# Patient Record
Sex: FEMALE | Race: WHITE | Employment: OTHER | URBAN - METROPOLITAN AREA
[De-identification: names, ages, dates, MRNs, and addresses within clinical notes are randomized per-mention and may not be internally consistent; named-entity substitution may affect disease eponyms.]

---

## 2017-06-13 ENCOUNTER — HOSPITAL ENCOUNTER (OUTPATIENT)
Dept: WOMENS IMAGING | Age: 77
Discharge: HOME | End: 2017-06-13
Attending: INTERNAL MEDICINE | Admitting: INTERNAL MEDICINE

## 2017-06-13 DIAGNOSIS — R92.8 ABNORMAL MAMMOGRAM: ICD-10-CM

## 2017-06-13 DIAGNOSIS — R92.8 OTHER ABNORMAL AND INCONCLUSIVE FINDINGS ON DIAGNOSTIC IMAGING OF BREAST: ICD-10-CM

## 2017-11-14 ENCOUNTER — HOSPITAL ENCOUNTER (OUTPATIENT)
Dept: OTHER | Age: 77
Discharge: HOME | End: 2017-11-14
Attending: INTERNAL MEDICINE | Admitting: INTERNAL MEDICINE

## 2017-11-14 DIAGNOSIS — J18.9 UNRESOLVED PNEUMONIA: ICD-10-CM

## 2017-12-04 ENCOUNTER — TELEPHONE (OUTPATIENT)
Dept: CARDIOLOGY CLINIC | Age: 77
End: 2017-12-04

## 2017-12-13 ENCOUNTER — HOSPITAL ENCOUNTER (OUTPATIENT)
Dept: WOMENS IMAGING | Age: 77
Discharge: HOME | End: 2017-12-13
Attending: INTERNAL MEDICINE | Admitting: INTERNAL MEDICINE

## 2017-12-13 DIAGNOSIS — Z12.31 VISIT FOR SCREENING MAMMOGRAM: ICD-10-CM

## 2017-12-13 DIAGNOSIS — R92.2 INCONCLUSIVE MAMMOGRAM: ICD-10-CM

## 2019-01-03 ENCOUNTER — HOSPITAL ENCOUNTER (OUTPATIENT)
Dept: WOMENS IMAGING | Age: 79
Discharge: HOME | End: 2019-01-03
Attending: INTERNAL MEDICINE | Admitting: INTERNAL MEDICINE
Payer: MEDICARE

## 2019-01-03 DIAGNOSIS — Z12.31 VISIT FOR SCREENING MAMMOGRAM: ICD-10-CM

## 2019-01-03 PROCEDURE — 77063 BREAST TOMOSYNTHESIS BI: CPT

## 2019-01-25 ENCOUNTER — APPOINTMENT (OUTPATIENT)
Dept: ULTRASOUND IMAGING | Age: 79
End: 2019-01-25
Payer: MEDICARE

## 2019-01-25 ENCOUNTER — HOSPITAL ENCOUNTER (OUTPATIENT)
Dept: WOMENS IMAGING | Age: 79
Discharge: HOME | End: 2019-01-25
Payer: MEDICARE

## 2019-01-25 DIAGNOSIS — R92.8 ABNORMAL MAMMOGRAM: ICD-10-CM

## 2019-01-25 PROCEDURE — G0279 TOMOSYNTHESIS, MAMMO: HCPCS

## 2019-07-31 ENCOUNTER — HOSPITAL ENCOUNTER (OUTPATIENT)
Dept: WOMENS IMAGING | Age: 79
Discharge: HOME | End: 2019-07-31
Payer: MEDICARE

## 2019-07-31 DIAGNOSIS — R92.8 ABNORMAL MAMMOGRAM: ICD-10-CM

## 2019-07-31 PROCEDURE — G0279 TOMOSYNTHESIS, MAMMO: HCPCS

## 2019-12-14 ENCOUNTER — APPOINTMENT (OUTPATIENT)
Dept: GENERAL RADIOLOGY | Age: 79
End: 2019-12-14
Payer: MEDICARE

## 2019-12-14 ENCOUNTER — HOSPITAL ENCOUNTER (EMERGENCY)
Age: 79
Discharge: LEFT BEFORE DISCHARGE | End: 2019-12-14
Attending: EMERGENCY MEDICINE
Payer: MEDICARE

## 2019-12-14 VITALS
RESPIRATION RATE: 22 BRPM | DIASTOLIC BLOOD PRESSURE: 60 MMHG | BODY MASS INDEX: 23.47 KG/M2 | OXYGEN SATURATION: 98 % | WEIGHT: 132.5 LBS | HEART RATE: 62 BPM | TEMPERATURE: 97.4 F | SYSTOLIC BLOOD PRESSURE: 148 MMHG

## 2019-12-14 DIAGNOSIS — J18.9 PNEUMONIA DUE TO ORGANISM: Primary | ICD-10-CM

## 2019-12-14 LAB
A/G RATIO: 1.3 (ref 1.1–2.2)
ALBUMIN SERPL-MCNC: 3.9 G/DL (ref 3.4–5)
ALP BLD-CCNC: 63 U/L (ref 40–129)
ALT SERPL-CCNC: 22 U/L (ref 10–40)
ANION GAP SERPL CALCULATED.3IONS-SCNC: 15 MMOL/L (ref 3–16)
APTT: 31.4 SEC (ref 24.2–36.2)
AST SERPL-CCNC: 38 U/L (ref 15–37)
BASOPHILS ABSOLUTE: 0 K/UL (ref 0–0.2)
BASOPHILS RELATIVE PERCENT: 1 %
BILIRUB SERPL-MCNC: 0.4 MG/DL (ref 0–1)
BUN BLDV-MCNC: 17 MG/DL (ref 7–20)
CALCIUM SERPL-MCNC: 9.5 MG/DL (ref 8.3–10.6)
CHLORIDE BLD-SCNC: 103 MMOL/L (ref 99–110)
CO2: 22 MMOL/L (ref 21–32)
CREAT SERPL-MCNC: 0.8 MG/DL (ref 0.6–1.2)
EOSINOPHILS ABSOLUTE: 0.3 K/UL (ref 0–0.6)
EOSINOPHILS RELATIVE PERCENT: 5.5 %
GFR AFRICAN AMERICAN: >60
GFR NON-AFRICAN AMERICAN: >60
GLOBULIN: 2.9 G/DL
GLUCOSE BLD-MCNC: 89 MG/DL (ref 70–99)
HCT VFR BLD CALC: 42 % (ref 36–48)
HEMOGLOBIN: 14 G/DL (ref 12–16)
INR BLD: 1.01 (ref 0.86–1.14)
LYMPHOCYTES ABSOLUTE: 1 K/UL (ref 1–5.1)
LYMPHOCYTES RELATIVE PERCENT: 21.1 %
MCH RBC QN AUTO: 32.3 PG (ref 26–34)
MCHC RBC AUTO-ENTMCNC: 33.4 G/DL (ref 31–36)
MCV RBC AUTO: 96.5 FL (ref 80–100)
MONOCYTES ABSOLUTE: 0.5 K/UL (ref 0–1.3)
MONOCYTES RELATIVE PERCENT: 11.4 %
NEUTROPHILS ABSOLUTE: 2.8 K/UL (ref 1.7–7.7)
NEUTROPHILS RELATIVE PERCENT: 61 %
PDW BLD-RTO: 14.3 % (ref 12.4–15.4)
PLATELET # BLD: 203 K/UL (ref 135–450)
PMV BLD AUTO: 9.6 FL (ref 5–10.5)
POTASSIUM SERPL-SCNC: 5.3 MMOL/L (ref 3.5–5.1)
PRO-BNP: 998 PG/ML (ref 0–449)
PROTHROMBIN TIME: 11.7 SEC (ref 10–13.2)
RBC # BLD: 4.35 M/UL (ref 4–5.2)
SODIUM BLD-SCNC: 140 MMOL/L (ref 136–145)
TOTAL PROTEIN: 6.8 G/DL (ref 6.4–8.2)
TROPONIN: <0.01 NG/ML
WBC # BLD: 4.6 K/UL (ref 4–11)

## 2019-12-14 PROCEDURE — 87040 BLOOD CULTURE FOR BACTERIA: CPT

## 2019-12-14 PROCEDURE — 83880 ASSAY OF NATRIURETIC PEPTIDE: CPT

## 2019-12-14 PROCEDURE — 84484 ASSAY OF TROPONIN QUANT: CPT

## 2019-12-14 PROCEDURE — 71046 X-RAY EXAM CHEST 2 VIEWS: CPT

## 2019-12-14 PROCEDURE — 85730 THROMBOPLASTIN TIME PARTIAL: CPT

## 2019-12-14 PROCEDURE — 85025 COMPLETE CBC W/AUTO DIFF WBC: CPT

## 2019-12-14 PROCEDURE — 36415 COLL VENOUS BLD VENIPUNCTURE: CPT

## 2019-12-14 PROCEDURE — 80053 COMPREHEN METABOLIC PANEL: CPT

## 2019-12-14 PROCEDURE — 85610 PROTHROMBIN TIME: CPT

## 2019-12-14 PROCEDURE — 99284 EMERGENCY DEPT VISIT MOD MDM: CPT

## 2019-12-14 PROCEDURE — 93005 ELECTROCARDIOGRAM TRACING: CPT | Performed by: EMERGENCY MEDICINE

## 2019-12-14 RX ORDER — AZITHROMYCIN 250 MG/1
TABLET, FILM COATED ORAL
Qty: 1 PACKET | Refills: 0 | Status: SHIPPED | OUTPATIENT
Start: 2019-12-14 | End: 2019-12-24

## 2019-12-14 ASSESSMENT — PAIN SCALES - GENERAL
PAINLEVEL_OUTOF10: 0
PAINLEVEL_OUTOF10: 0

## 2019-12-16 LAB
EKG ATRIAL RATE: 65 BPM
EKG DIAGNOSIS: NORMAL
EKG P AXIS: 85 DEGREES
EKG P-R INTERVAL: 148 MS
EKG Q-T INTERVAL: 458 MS
EKG QRS DURATION: 100 MS
EKG QTC CALCULATION (BAZETT): 476 MS
EKG R AXIS: 48 DEGREES
EKG T AXIS: 61 DEGREES
EKG VENTRICULAR RATE: 65 BPM

## 2019-12-16 PROCEDURE — 93010 ELECTROCARDIOGRAM REPORT: CPT | Performed by: INTERNAL MEDICINE

## 2019-12-18 LAB
BLOOD CULTURE, ROUTINE: NORMAL
CULTURE, BLOOD 2: NORMAL

## 2020-01-31 ENCOUNTER — HOSPITAL ENCOUNTER (OUTPATIENT)
Dept: WOMENS IMAGING | Age: 80
Discharge: HOME | End: 2020-01-31
Payer: MEDICARE

## 2020-01-31 PROCEDURE — G0279 TOMOSYNTHESIS, MAMMO: HCPCS

## 2020-06-10 ENCOUNTER — APPOINTMENT (OUTPATIENT)
Dept: CT IMAGING | Age: 80
End: 2020-06-10
Payer: MEDICARE

## 2020-06-10 ENCOUNTER — HOSPITAL ENCOUNTER (EMERGENCY)
Age: 80
Discharge: HOME | End: 2020-06-10
Attending: EMERGENCY MEDICINE
Payer: MEDICARE

## 2020-06-10 ENCOUNTER — APPOINTMENT (OUTPATIENT)
Dept: GENERAL RADIOLOGY | Age: 80
End: 2020-06-10
Payer: MEDICARE

## 2020-06-10 VITALS
HEART RATE: 62 BPM | TEMPERATURE: 100 F | SYSTOLIC BLOOD PRESSURE: 132 MMHG | OXYGEN SATURATION: 99 % | DIASTOLIC BLOOD PRESSURE: 90 MMHG | RESPIRATION RATE: 18 BRPM

## 2020-06-10 PROCEDURE — 99284 EMERGENCY DEPT VISIT MOD MDM: CPT

## 2020-06-10 PROCEDURE — 26770 TREAT FINGER DISLOCATION: CPT

## 2020-06-10 PROCEDURE — 90471 IMMUNIZATION ADMIN: CPT | Performed by: PHYSICIAN ASSISTANT

## 2020-06-10 PROCEDURE — 70450 CT HEAD/BRAIN W/O DYE: CPT

## 2020-06-10 PROCEDURE — 90715 TDAP VACCINE 7 YRS/> IM: CPT | Performed by: PHYSICIAN ASSISTANT

## 2020-06-10 PROCEDURE — U0003 INFECTIOUS AGENT DETECTION BY NUCLEIC ACID (DNA OR RNA); SEVERE ACUTE RESPIRATORY SYNDROME CORONAVIRUS 2 (SARS-COV-2) (CORONAVIRUS DISEASE [COVID-19]), AMPLIFIED PROBE TECHNIQUE, MAKING USE OF HIGH THROUGHPUT TECHNOLOGIES AS DESCRIBED BY CMS-2020-01-R: HCPCS

## 2020-06-10 PROCEDURE — 73120 X-RAY EXAM OF HAND: CPT

## 2020-06-10 PROCEDURE — 73130 X-RAY EXAM OF HAND: CPT

## 2020-06-10 PROCEDURE — 96372 THER/PROPH/DIAG INJ SC/IM: CPT

## 2020-06-10 PROCEDURE — 6360000002 HC RX W HCPCS: Performed by: PHYSICIAN ASSISTANT

## 2020-06-10 PROCEDURE — U0002 COVID-19 LAB TEST NON-CDC: HCPCS

## 2020-06-10 RX ORDER — BRIMONIDINE TARTRATE 2 MG/ML
1 SOLUTION/ DROPS OPHTHALMIC 2 TIMES DAILY
COMMUNITY
Start: 2019-05-20 | End: 2021-07-01

## 2020-06-10 RX ORDER — CEPHALEXIN 500 MG/1
500 CAPSULE ORAL 4 TIMES DAILY
Qty: 12 CAPSULE | Refills: 0 | Status: SHIPPED | OUTPATIENT
Start: 2020-06-10 | End: 2020-06-13

## 2020-06-10 RX ORDER — DONEPEZIL HYDROCHLORIDE 10 MG/1
10 TABLET, FILM COATED ORAL NIGHTLY
COMMUNITY
Start: 2018-10-24 | End: 2021-07-01

## 2020-06-10 RX ORDER — LATANOPROST 50 UG/ML
1 SOLUTION/ DROPS OPHTHALMIC NIGHTLY
COMMUNITY
Start: 2019-03-02

## 2020-06-10 RX ORDER — TRAMADOL HYDROCHLORIDE 50 MG/1
50 TABLET ORAL EVERY 6 HOURS PRN
Qty: 5 TABLET | Refills: 0 | Status: SHIPPED | OUTPATIENT
Start: 2020-06-10 | End: 2020-06-11

## 2020-06-10 RX ORDER — CEFAZOLIN SODIUM 1 G/3ML
1 INJECTION, POWDER, FOR SOLUTION INTRAMUSCULAR; INTRAVENOUS ONCE
Status: COMPLETED | OUTPATIENT
Start: 2020-06-10 | End: 2020-06-10

## 2020-06-10 RX ORDER — SERTRALINE HYDROCHLORIDE 25 MG/1
25 TABLET, FILM COATED ORAL DAILY
COMMUNITY
Start: 2019-06-09 | End: 2021-07-01

## 2020-06-10 RX ADMIN — CEFAZOLIN SODIUM 1 G: 1 INJECTION, POWDER, FOR SOLUTION INTRAMUSCULAR; INTRAVENOUS at 17:44

## 2020-06-10 RX ADMIN — TETANUS TOXOID, REDUCED DIPHTHERIA TOXOID AND ACELLULAR PERTUSSIS VACCINE, ADSORBED 0.5 ML: 5; 2.5; 8; 8; 2.5 SUSPENSION INTRAMUSCULAR at 15:54

## 2020-06-10 ASSESSMENT — PAIN SCALES - GENERAL
PAINLEVEL_OUTOF10: 8
PAINLEVEL_OUTOF10: 6

## 2020-06-10 ASSESSMENT — PAIN - FUNCTIONAL ASSESSMENT
PAIN_FUNCTIONAL_ASSESSMENT: PREVENTS OR INTERFERES SOME ACTIVE ACTIVITIES AND ADLS
PAIN_FUNCTIONAL_ASSESSMENT: 0-10

## 2020-06-10 ASSESSMENT — PAIN SCALES - WONG BAKER: WONGBAKER_NUMERICALRESPONSE: 8

## 2020-06-10 ASSESSMENT — PAIN DESCRIPTION - PROGRESSION: CLINICAL_PROGRESSION: GRADUALLY WORSENING

## 2020-06-10 ASSESSMENT — PAIN DESCRIPTION - ONSET: ONSET: SUDDEN

## 2020-06-10 ASSESSMENT — PAIN DESCRIPTION - PAIN TYPE
TYPE: ACUTE PAIN
TYPE: ACUTE PAIN

## 2020-06-10 ASSESSMENT — ENCOUNTER SYMPTOMS
NAUSEA: 0
VOMITING: 0
BACK PAIN: 0
ABDOMINAL PAIN: 0
RESPIRATORY NEGATIVE: 1

## 2020-06-10 ASSESSMENT — PAIN DESCRIPTION - ORIENTATION
ORIENTATION: LEFT
ORIENTATION: LEFT

## 2020-06-10 ASSESSMENT — PAIN DESCRIPTION - FREQUENCY
FREQUENCY: CONTINUOUS
FREQUENCY: CONTINUOUS

## 2020-06-10 ASSESSMENT — PAIN DESCRIPTION - DESCRIPTORS
DESCRIPTORS: ACHING;CONSTANT
DESCRIPTORS: ACHING

## 2020-06-10 ASSESSMENT — PAIN DESCRIPTION - LOCATION
LOCATION: HAND
LOCATION: HAND

## 2020-06-10 NOTE — ACP (ADVANCE CARE PLANNING)
Advance Care Planning     Advance Care Planning Activator (Inpatient)  Conversation Note      Date of ACP Conversation: 6/10/2020    Conversation Conducted with: Patient with Decision Making Capacity    ACP Activator: Karie 48 Maker:     Current Designated Health Care Decision Maker:   Primary Decision Maker: Maurice Bernal - [phone]    Secondary Decision Maker: Pamela Andres - [phone]   Validated with patient and spouse, their adult children are the decision makers. Care Preferences    Ventilation: \"If you were in your present state of health and suddenly became very ill and were unable to breathe on your own, what would your preference be about the use of a ventilator (breathing machine) if it were available to you? \"      Would the patient desire the use of ventilator (breathing machine)?: yes    \"If your health worsens and it becomes clear that your chance of recovery is unlikely, what would your preference be about the use of a ventilator (breathing machine) if it were available to you? \"     Would the patient desire the use of ventilator (breathing machine)?: No      Resuscitation  \"CPR works best to restart the heart when there is a sudden event, like a heart attack, in someone who is otherwise healthy. Unfortunately, CPR does not typically restart the heart for people who have serious health conditions or who are very sick. \"    \"In the event your heart stopped as a result of an underlying serious health condition, would you want attempts to be made to restart your heart (answer \"yes\" for attempt to resuscitate) or would you prefer a natural death (answer \"no\" for do not attempt to resuscitate)? \" yes    Patient and spouse advised that they have Advanced Directives at Home. [x] Yes   [] No   Educated Patient / Urbano Junior regarding differences between Advance Directives and portable DNR orders.     Length of ACP Conversation in minutes:

## 2020-06-10 NOTE — CONSULTS
I was contacted by Karina Fernandez PA-C at [facility] ER regarding Ms. Gabriela Lay. Pt has a dislocation of left Small Finger after fall. Giovanny Rosario reports that she / MD in Er are unable to achieve reduction of the Small Finger metacarpo-phalangeal joint dislocation. I have reviewed Tanja Diaz's X-Ray from today which clearly demonstrates an irriducible dislocation of the left Small Finger metacarpo-phalangeal joint with interposed volar plate including a visible Sesamoid bone interposed in the joint. I explained the finding to ESSIE Albarran and explained that this injury would require Open Reduction in the OR. Giovanny Rosario felt that based upon her examination and treatment of Gabriela Lay, that there were no foreseeable impending complications at this time (no neurovascular or skin tenting symptoms in particular) which would preclude safely addressing this injury urgently tomorrow. I have asked that Gabriela Lay be COVID tested in ER now in anticipation of surgery tomorrow. I have my scheduling dept currently working to have Ms. Gabriela Lay added to the surgery schedule Urgently for tomorrow. My office has been instructed to communicate the details of surgery (pre-op instructions, etc) to KATHYA Albarran in ER by phone prior to the patient being discharged tonight.

## 2020-06-10 NOTE — ED PROVIDER NOTES
 Irregular heart beat     Mitral valve prolapse        SURGICAL HISTORY           Procedure Laterality Date    BREAST BIOPSY Left     KNEE SURGERY Left        CURRENT MEDICATIONS     [unfilled]    ALLERGIES     Patient has no known allergies. FAMILY HISTORY     History reviewed. No pertinent family history. No family status information on file. SOCIAL HISTORY      reports that she has never smoked. She has never used smokeless tobacco. She reports that she does not drink alcohol. PHYSICAL EXAM    (up to 7 for level 4, 8 or more for level 5)     ED Triage Vitals [06/10/20 1318]   Enc Vitals Group      BP (!) 132/90      Pulse 62      Resp 18      Temp 100 °F (37.8 °C)      Temp Source Oral      SpO2 99 %      Weight       Height       Head Circumference       Peak Flow       Pain Score       Pain Loc       Pain Edu? Excl. in [address]? Physical Exam  Vitals signs reviewed. Constitutional:       Appearance: Normal appearance. HENT:      Head: Normocephalic and atraumatic. Neck:      Musculoskeletal: Normal range of motion and neck supple. Pulmonary:      Effort: Pulmonary effort is normal. No respiratory distress.    Musculoskeletal:      Comments: LUE: Deformity noted at left fifth MCP joint, sensation intact distally, cap refill less than 3 seconds, limited flexion/extension present, radial pulse +2  BLE: No focal tenderness to palpation, full range of motion throughout, ambulating in ED without assistance or difficulty, pelvis stable to rocking  RUE: Full range of motion throughout, no focal tenderness to palpation  Back: No midline spinous process tenderness to palpation throughout   Skin:     Comments: Superficial abrasions to knees bilaterally, not actively bleeding, not grossly contaminated  Superficial pinpoint abrasion noted to posterior lateral aspect of hand overlying 5th MCP joint, small superficial abrasion to palmar aspect of hand near 5th MCP joint, several other block and local infiltration    Anesthesia:  Local anesthesia used: yes  Local Anesthetic: lidocaine 1% without epinephrine    Sedation:  Patient sedated: no    Manipulation performed: yes  Reduction successful: no  Supplies used: aluminum splint  Post-procedure neurovascular assessment: post-procedure neurovascularly intact  Post-procedure distal perfusion: normal  Post-procedure neurological function: normal  Post-procedure range of motion: unchanged          FINAL IMPRESSION      1. Closed dislocation of left little finger          DISPOSITION/PLAN   [unfilled]    PATIENT REFERRED TO:  [facility] Emergency Department  [id number]  [phone]  Go to   If symptoms worsen    Inis Lefort, MD  [address]  [phone]    Go in 1 day  For surgery tomorrow.  Be here at [facility] and register at 12:30 for surgery at 2pm.      DISCHARGE MEDICATIONS:  Discharge Medication List as of 6/10/2020  5:28 PM      START taking these medications    Details   traMADol (ULTRAM) 50 MG tablet Take 1 tablet by mouth every 6 hours as needed for Pain for up to 1 day., Disp-5 tablet, R-0Print             (Please note that portions of this note were completed with a voice recognition program.  Efforts were made to edit the dictations but occasionally words are mis-transcribed.)    [address] [facility]          [address] [place], [place]  06/10/20 203

## 2020-06-11 ENCOUNTER — ANESTHESIA EVENT (OUTPATIENT)
Dept: OPERATING ROOM | Age: 80
End: 2020-06-11
Payer: MEDICARE

## 2020-06-11 ENCOUNTER — HOSPITAL ENCOUNTER (OUTPATIENT)
Age: 80
Setting detail: OUTPATIENT SURGERY
Discharge: HOME | End: 2020-06-11
Attending: ORTHOPAEDIC SURGERY | Admitting: ORTHOPAEDIC SURGERY
Payer: MEDICARE

## 2020-06-11 ENCOUNTER — HOSPITAL ENCOUNTER (OUTPATIENT)
Age: 80
Discharge: HOME | End: 2020-06-11
Payer: MEDICARE

## 2020-06-11 ENCOUNTER — ANESTHESIA (OUTPATIENT)
Dept: OPERATING ROOM | Age: 80
End: 2020-06-11
Payer: MEDICARE

## 2020-06-11 ENCOUNTER — APPOINTMENT (OUTPATIENT)
Dept: GENERAL RADIOLOGY | Age: 80
End: 2020-06-11
Attending: ORTHOPAEDIC SURGERY
Payer: MEDICARE

## 2020-06-11 ENCOUNTER — TELEPHONE (OUTPATIENT)
Dept: ORTHOPEDIC SURGERY | Age: 80
End: 2020-06-11

## 2020-06-11 VITALS
OXYGEN SATURATION: 100 % | RESPIRATION RATE: 10 BRPM | SYSTOLIC BLOOD PRESSURE: 84 MMHG | TEMPERATURE: 96.1 F | DIASTOLIC BLOOD PRESSURE: 52 MMHG

## 2020-06-11 VITALS
HEART RATE: 64 BPM | OXYGEN SATURATION: 98 % | RESPIRATION RATE: 18 BRPM | DIASTOLIC BLOOD PRESSURE: 69 MMHG | SYSTOLIC BLOOD PRESSURE: 132 MMHG | TEMPERATURE: 97.2 F

## 2020-06-11 LAB
SARS-COV-2, NAAT: NOT DETECTED
SARS-COV-2, PCR: NOT DETECTED

## 2020-06-11 PROCEDURE — 6360000002 HC RX W HCPCS

## 2020-06-11 PROCEDURE — 7100000011 HC PHASE II RECOVERY - ADDTL 15 MIN: Performed by: ORTHOPAEDIC SURGERY

## 2020-06-11 PROCEDURE — 3209999900 FLUORO FOR SURGICAL PROCEDURES

## 2020-06-11 PROCEDURE — U0002 COVID-19 LAB TEST NON-CDC: HCPCS

## 2020-06-11 PROCEDURE — 7100000010 HC PHASE II RECOVERY - FIRST 15 MIN: Performed by: ORTHOPAEDIC SURGERY

## 2020-06-11 PROCEDURE — 2709999900 HC NON-CHARGEABLE SUPPLY: Performed by: ORTHOPAEDIC SURGERY

## 2020-06-11 PROCEDURE — 7100000000 HC PACU RECOVERY - FIRST 15 MIN: Performed by: ORTHOPAEDIC SURGERY

## 2020-06-11 PROCEDURE — 7100000001 HC PACU RECOVERY - ADDTL 15 MIN: Performed by: ORTHOPAEDIC SURGERY

## 2020-06-11 PROCEDURE — 2500000003 HC RX 250 WO HCPCS: Performed by: ORTHOPAEDIC SURGERY

## 2020-06-11 PROCEDURE — 2500000003 HC RX 250 WO HCPCS

## 2020-06-11 PROCEDURE — 2580000003 HC RX 258

## 2020-06-11 PROCEDURE — 3600000015 HC SURGERY LEVEL 5 ADDTL 15MIN: Performed by: ORTHOPAEDIC SURGERY

## 2020-06-11 PROCEDURE — 3700000001 HC ADD 15 MINUTES (ANESTHESIA): Performed by: ORTHOPAEDIC SURGERY

## 2020-06-11 PROCEDURE — 3600000005 HC SURGERY LEVEL 5 BASE: Performed by: ORTHOPAEDIC SURGERY

## 2020-06-11 PROCEDURE — 3700000000 HC ANESTHESIA ATTENDED CARE: Performed by: ORTHOPAEDIC SURGERY

## 2020-06-11 RX ORDER — ONDANSETRON 2 MG/ML
INJECTION INTRAMUSCULAR; INTRAVENOUS PRN
Status: DISCONTINUED | OUTPATIENT
Start: 2020-06-11 | End: 2020-06-11 | Stop reason: SDUPTHER

## 2020-06-11 RX ORDER — HYDRALAZINE HYDROCHLORIDE 20 MG/ML
5 INJECTION INTRAMUSCULAR; INTRAVENOUS EVERY 10 MIN PRN
Status: DISCONTINUED | OUTPATIENT
Start: 2020-06-11 | End: 2020-06-11 | Stop reason: HOSPADM

## 2020-06-11 RX ORDER — PROPOFOL 10 MG/ML
INJECTION, EMULSION INTRAVENOUS PRN
Status: DISCONTINUED | OUTPATIENT
Start: 2020-06-11 | End: 2020-06-11 | Stop reason: SDUPTHER

## 2020-06-11 RX ORDER — ONDANSETRON 2 MG/ML
4 INJECTION INTRAMUSCULAR; INTRAVENOUS
Status: DISCONTINUED | OUTPATIENT
Start: 2020-06-11 | End: 2020-06-11 | Stop reason: HOSPADM

## 2020-06-11 RX ORDER — HYDROCODONE BITARTRATE AND ACETAMINOPHEN 5; 325 MG/1; MG/1
2 TABLET ORAL PRN
Status: DISCONTINUED | OUTPATIENT
Start: 2020-06-11 | End: 2020-06-11 | Stop reason: HOSPADM

## 2020-06-11 RX ORDER — SODIUM CHLORIDE 9 MG/ML
INJECTION, SOLUTION INTRAVENOUS CONTINUOUS PRN
Status: DISCONTINUED | OUTPATIENT
Start: 2020-06-11 | End: 2020-06-11 | Stop reason: SDUPTHER

## 2020-06-11 RX ORDER — MEPERIDINE HYDROCHLORIDE 25 MG/ML
12.5 INJECTION INTRAMUSCULAR; INTRAVENOUS; SUBCUTANEOUS ONCE
Status: DISCONTINUED | OUTPATIENT
Start: 2020-06-11 | End: 2020-06-11 | Stop reason: HOSPADM

## 2020-06-11 RX ORDER — FENTANYL CITRATE 50 UG/ML
25 INJECTION, SOLUTION INTRAMUSCULAR; INTRAVENOUS EVERY 5 MIN PRN
Status: DISCONTINUED | OUTPATIENT
Start: 2020-06-11 | End: 2020-06-11 | Stop reason: HOSPADM

## 2020-06-11 RX ORDER — BUPIVACAINE HYDROCHLORIDE 5 MG/ML
INJECTION, SOLUTION EPIDURAL; INTRACAUDAL
Status: COMPLETED | OUTPATIENT
Start: 2020-06-11 | End: 2020-06-11

## 2020-06-11 RX ORDER — LIDOCAINE HYDROCHLORIDE 20 MG/ML
INJECTION, SOLUTION EPIDURAL; INFILTRATION; INTRACAUDAL; PERINEURAL PRN
Status: DISCONTINUED | OUTPATIENT
Start: 2020-06-11 | End: 2020-06-11 | Stop reason: SDUPTHER

## 2020-06-11 RX ORDER — SODIUM CHLORIDE 9 MG/ML
INJECTION, SOLUTION INTRAVENOUS CONTINUOUS
Status: CANCELLED | OUTPATIENT
Start: 2020-06-11

## 2020-06-11 RX ORDER — PROMETHAZINE HYDROCHLORIDE 25 MG/ML
6.25 INJECTION, SOLUTION INTRAMUSCULAR; INTRAVENOUS
Status: DISCONTINUED | OUTPATIENT
Start: 2020-06-11 | End: 2020-06-11 | Stop reason: HOSPADM

## 2020-06-11 RX ORDER — HYDROCODONE BITARTRATE AND ACETAMINOPHEN 5; 325 MG/1; MG/1
1 TABLET ORAL EVERY 6 HOURS PRN
Qty: 10 TABLET | Refills: 0 | Status: SHIPPED | OUTPATIENT
Start: 2020-06-11 | End: 2020-06-18

## 2020-06-11 RX ORDER — SODIUM CHLORIDE 0.9 % (FLUSH) 0.9 %
10 SYRINGE (ML) INJECTION EVERY 12 HOURS SCHEDULED
Status: CANCELLED | OUTPATIENT
Start: 2020-06-11

## 2020-06-11 RX ORDER — SODIUM CHLORIDE 0.9 % (FLUSH) 0.9 %
10 SYRINGE (ML) INJECTION PRN
Status: CANCELLED | OUTPATIENT
Start: 2020-06-11

## 2020-06-11 RX ORDER — HYDROCODONE BITARTRATE AND ACETAMINOPHEN 5; 325 MG/1; MG/1
1 TABLET ORAL PRN
Status: DISCONTINUED | OUTPATIENT
Start: 2020-06-11 | End: 2020-06-11 | Stop reason: HOSPADM

## 2020-06-11 RX ORDER — FENTANYL CITRATE 50 UG/ML
INJECTION, SOLUTION INTRAMUSCULAR; INTRAVENOUS PRN
Status: DISCONTINUED | OUTPATIENT
Start: 2020-06-11 | End: 2020-06-11 | Stop reason: SDUPTHER

## 2020-06-11 RX ORDER — FENTANYL CITRATE 50 UG/ML
50 INJECTION, SOLUTION INTRAMUSCULAR; INTRAVENOUS EVERY 5 MIN PRN
Status: DISCONTINUED | OUTPATIENT
Start: 2020-06-11 | End: 2020-06-11 | Stop reason: HOSPADM

## 2020-06-11 RX ADMIN — FENTANYL CITRATE 50 MCG: 50 INJECTION INTRAMUSCULAR; INTRAVENOUS at 13:56

## 2020-06-11 RX ADMIN — SODIUM CHLORIDE: 9 INJECTION, SOLUTION INTRAVENOUS at 13:18

## 2020-06-11 RX ADMIN — FENTANYL CITRATE 25 MCG: 50 INJECTION INTRAMUSCULAR; INTRAVENOUS at 13:49

## 2020-06-11 RX ADMIN — PROPOFOL 20 MG: 10 INJECTION, EMULSION INTRAVENOUS at 13:50

## 2020-06-11 RX ADMIN — LIDOCAINE HYDROCHLORIDE 80 MG: 20 INJECTION, SOLUTION EPIDURAL; INFILTRATION; INTRACAUDAL; PERINEURAL at 13:49

## 2020-06-11 RX ADMIN — ONDANSETRON 4 MG: 2 INJECTION INTRAMUSCULAR; INTRAVENOUS at 13:58

## 2020-06-11 RX ADMIN — PROPOFOL 110 MG: 10 INJECTION, EMULSION INTRAVENOUS at 13:49

## 2020-06-11 ASSESSMENT — PULMONARY FUNCTION TESTS
PIF_VALUE: 3
PIF_VALUE: 0
PIF_VALUE: 3
PIF_VALUE: 4
PIF_VALUE: 0
PIF_VALUE: 4
PIF_VALUE: 4
PIF_VALUE: 5
PIF_VALUE: 13
PIF_VALUE: 4
PIF_VALUE: 0
PIF_VALUE: 4
PIF_VALUE: 18
PIF_VALUE: 1
PIF_VALUE: 3
PIF_VALUE: 16
PIF_VALUE: 1
PIF_VALUE: 3
PIF_VALUE: 5
PIF_VALUE: 13
PIF_VALUE: 13
PIF_VALUE: 5

## 2020-06-11 ASSESSMENT — PAIN SCALES - GENERAL
PAINLEVEL_OUTOF10: 0
PAINLEVEL_OUTOF10: 0
PAINLEVEL_OUTOF10: 6
PAINLEVEL_OUTOF10: 0

## 2020-06-11 ASSESSMENT — PAIN DESCRIPTION - DESCRIPTORS: DESCRIPTORS: ACHING

## 2020-06-11 ASSESSMENT — PAIN DESCRIPTION - FREQUENCY: FREQUENCY: CONTINUOUS

## 2020-06-11 ASSESSMENT — PAIN DESCRIPTION - ONSET: ONSET: ON-GOING

## 2020-06-11 ASSESSMENT — PAIN DESCRIPTION - LOCATION: LOCATION: FINGER (COMMENT WHICH ONE)

## 2020-06-11 ASSESSMENT — PAIN DESCRIPTION - PAIN TYPE: TYPE: ACUTE PAIN

## 2020-06-11 ASSESSMENT — PAIN DESCRIPTION - ORIENTATION: ORIENTATION: LEFT

## 2020-06-11 ASSESSMENT — PAIN DESCRIPTION - PROGRESSION: CLINICAL_PROGRESSION: NOT CHANGED

## 2020-06-11 NOTE — ANESTHESIA PRE PROCEDURE
[facility] Department of Anesthesiology  Pre-Anesthesia Evaluation/Consultation       Name:  Anat Colorado  : [birth year]  Age:  [de-identified] y.o. MRN:  [id number]  Date: 2020           Surgeon: Surgeon(s):  Katy Damico MD    Procedure: Procedure(s):  OPEN REDUCTION INTERNAL FIXATION LEFT SMALL FINGER AND DISLOCATION OF METACARPOPHALANGEAL JOINT     No Known Allergies  There is no problem list on file for this patient. Past Medical History:   Diagnosis Date    Dementia ([address]     Irregular heart beat     Mitral valve prolapse      Past Surgical History:   Procedure Laterality Date    BREAST BIOPSY Left     KNEE SURGERY Left      Social History     Tobacco Use    Smoking status: Never Smoker    Smokeless tobacco: Never Used   Substance Use Topics    Alcohol use: No    Drug use: Not on file     Medications  No current facility-administered medications on file prior to encounter. Current Outpatient Medications on File Prior to Encounter   Medication Sig Dispense Refill    donepezil (ARICEPT) 10 MG tablet Take 10 mg by mouth nightly      sertraline (ZOLOFT) 25 MG tablet Take 25 mg by mouth daily      calcium carbonate-vitamin D (CALTRATE) 600-400 MG-UNIT TABS per tab Take 1 tablet by mouth daily      Cyanocobalamin (VITAMIN B-12) 5000 MCG SUBL Place 5,000 mcg under the tongue daily      ASPIRIN 81 PO Take 81 mg by mouth daily      latanoprost (XALATAN) 0.005 % ophthalmic solution Apply 1 drop to eye nightly      brimonidine (ALPHAGAN) 0.2 % ophthalmic solution Apply 1 drop to eye 2 times daily      traMADol (ULTRAM) 50 MG tablet Take 1 tablet by mouth every 6 hours as needed for Pain for up to 1 day.  5 tablet 0    cephALEXin (KEFLEX) 500 MG capsule Take 1 capsule by mouth 4 times daily for 3 days 12 capsule 0    metoprolol tartrate (LOPRESSOR) 25 MG tablet Take 25 mg by mouth 2 times daily       No current facility-administered medications for this encounter. Vital Signs (Current)   Vitals:    20 1157   BP: 134/77   Pulse: 68   Resp: 14   Temp: 96.6 °F (35.9 °C)   TempSrc: Temporal   SpO2: 97%                                          BP Readings from Last 3 Encounters:   20 134/77   06/10/20 (!) 132/90   19 (!) 148/60     Vital Signs Statistics (for past 48 hrs)     Temp  Av.3 °F (36.8 °C)  Min: 96.6 °F (35.9 °C)   Min taken time: 20 1157  Max: 100 °F (37.8 °C)   Max taken time: 06/10/20 1318  Pulse  Av  Min: 58   Min taken time: 06/10/20 1318  Max: 76   Max taken time: 20 1157  Resp  Av  Min: 14   Min taken time: 20 1157  Max: 18   Max taken time: 06/10/20 1318  BP  Min: 132/90   Min taken time: 06/10/20 1318  Max: 134/77   Max taken time: 20 1157  SpO2  Av %  Min: 97 %   Min taken time: 20 1157  Max: 99 %   Max taken time: 06/10/20 1318  BP Readings from Last 3 Encounters:   20 134/77   06/10/20 (!) 132/90   19 (!) 148/60       BMI  There is no height or weight on file to calculate BMI. Estimated body mass index is 23.47 kg/m² as calculated from the following:    Height as of 10/18/17: 5' 3\" (1.6 m). Weight as of 19: 132 lb 7.9 oz (60.1 kg).     CBC   Lab Results   Component Value Date    WBC 4.6 2019    RBC 4.35 2019    HGB 14.0 2019    HCT 42.0 2019    MCV 96.5 2019    RDW 14.3 2019     2019     CMP    Lab Results   Component Value Date     2019    K 5.3 2019     2019    CO2 22 2019    BUN 17 2019    CREATININE 0.8 2019    GFRAA >60 2019    AGRATIO 1.3 2019    LABGLOM >60 2019    GLUCOSE 89 2019    PROT 6.8 2019    CALCIUM 9.5 2019    BILITOT 0.4 2019    ALKPHOS 63 2019    AST 38 2019    ALT 22 2019     BMP    Lab Results   Component Value Date     2019    K 5.3 2019     2019 CO2 22 12/14/2019    BUN 17 12/14/2019    CREATININE 0.8 12/14/2019    CALCIUM 9.5 12/14/2019    GFRAA >60 12/14/2019    LABGLOM >60 12/14/2019    GLUCOSE 89 12/14/2019     POCGlucose  No results for input(s): GLUCOSE in the last 72 hours. [facility]    Lab Results   Component Value Date    PROTIME 11.7 12/14/2019    INR 1.01 12/14/2019    APTT 31.4 19/84/7696     HCG (If Applicable) No results found for: PREGTESTUR, PREGSERUM, HCG, HCGQUANT   ABGs No results found for: PHART, PO2ART, EQE6HDH, WLD9PLF, BEART, O6JYBGMU   Type & Screen (If Applicable)  No results found for: LABABO, LABRH                         BMI: Wt Readings from Last 3 Encounters:       NPO Status:  >8h                          Anesthesia Evaluation  Patient summary reviewed no history of anesthetic complications:   Airway: Mallampati: II  TM distance: >3 FB   Neck ROM: full  Mouth opening: > = 3 FB Dental:          Pulmonary: breath sounds clear to auscultation      (-) COPD, asthma and sleep apnea                           Cardiovascular:  Exercise tolerance: good (>4 METS),   (+) pacemaker: pacemaker, dysrhythmias:,     (-) hypertension      Rhythm: regular  Rate: normal                    Neuro/Psych:   (+) psychiatric history: stable with treatment   (-) seizures, TIA and CVA            ROS comment: dementia GI/Hepatic/Renal:        (-) GERD, liver disease, no renal disease, bowel prep and no morbid obesity       Endo/Other:        (-) diabetes mellitus, hypothyroidism               Abdominal:           Vascular:     - DVT and PE. Anesthesia Plan      general     ASA 3       Induction: intravenous. MIPS: Postoperative opioids intended and Prophylactic antiemetics administered. Anesthetic plan and risks discussed with patient. Plan discussed with CRNA.                 This pre-anesthesia assessment may be used as a history and physical.    DOS STAFF ADDENDUM:    Pt seen and examined, chart reviewed (including anesthesia, drug and allergy history). No interval changes to history and physical examination. Anesthetic plan, risks, benefits, alternatives, and personnel involved discussed with patient. Patient verbalized an understanding and agrees to proceed.       Je Narayanan MD  June 11, 2020  12:13 PM

## 2020-06-11 NOTE — ANESTHESIA POSTPROCEDURE EVALUATION
Department of Anesthesiology  Postprocedure Note    Patient: Shon Caldwell  MRN: [id number]  [place]: 1940  Date of evaluation: 6/11/2020  Time:  4:13 PM     Procedure Summary     Date:  06/11/20 Room / Location:   [facility] [address] [place] [address]    Anesthesia Start:  8795 Anesthesia Stop:  1574    Procedure:  CLOSED REDUCTION LEFT SMALL FINGER DISLOCATION OF METACARPOPHALANGEAL JOINT (Left ) Diagnosis:  (.)    Surgeon:  Cosmo Campos MD Responsible Provider:  Gillian Sahu MD    Anesthesia Type:  general ASA Status:  3          Anesthesia Type: general    Tod Phase I: Tod Score: 10    Tod Phase II: Tod Score: 10    Last vitals: Reviewed and per EMR flowsheets.        Anesthesia Post Evaluation    Patient location during evaluation: PACU  Patient participation: complete - patient participated  Level of consciousness: awake and alert  Airway patency: patent  Nausea & Vomiting: no nausea and no vomiting  Complications: no  Cardiovascular status: hemodynamically stable  Respiratory status: acceptable  Hydration status: stable

## 2020-06-12 ENCOUNTER — TELEPHONE (OUTPATIENT)
Dept: ORTHOPEDIC SURGERY | Age: 80
End: 2020-06-12

## 2020-06-12 ENCOUNTER — OFFICE VISIT (OUTPATIENT)
Dept: ORTHOPEDIC SURGERY | Age: 80
End: 2020-06-12
Payer: MEDICARE

## 2020-06-12 VITALS — WEIGHT: 132 LBS | HEIGHT: 63 IN | BODY MASS INDEX: 23.39 KG/M2

## 2020-06-12 PROCEDURE — 29075 APPL CST ELBW FNGR SHORT ARM: CPT | Performed by: ORTHOPAEDIC SURGERY

## 2020-06-12 PROCEDURE — 99024 POSTOP FOLLOW-UP VISIT: CPT | Performed by: ORTHOPAEDIC SURGERY

## 2020-06-12 NOTE — PROGRESS NOTES
I applied a Short Arm Fiberglass Cast to Ernesto Diaz's Left, Ring Finger, Small Finger. I applied casting stockinette,  2 rolls of padding in an overlapping fashion. Charissa Dillard requested Green color fiberglass. I rolled 2 rolls of fiberglass in an overlapping fashion. Her  Left, Ring Finger, Small Finger was maintained in a 90 degree Flexion. At the conclusion of the procedure, Chelly Diaz's nail beds were pink in color, the extremity is warm to the touch. Capillary refill is less than 2 seconds. Charissa Nevarezr was instructed in proper care of cast.  Do not get wet, keep all items out of cast.  If cast is painful please make appointment to get checked. She was also briefed on circulation compromise. If digits are cold, blue, and tingling patient must must seek care. If after hours patient is to go to Emergency Room. During office hours patient must come in to office.

## 2020-06-12 NOTE — OP NOTE
95  degrees and attempted to extract the obviously interposed volar plate. Happily, the finger was able then to be reduced in a position which was  felt to be anatomically aligned under fluoroscopic guidance. The  position of the joint was checked and it was in fact found to be  anatomically reduced and concentric. Stress examination revealed that  there was significant dorsal volar instability which was difficult to  maintain the reduction, as thus, I felt that it was appropriate to  splint her in flexion. She was carefully placed in a short-arm  fiberglass intrinsic plus splint incorporating the ring and small  fingers of the MCP joint flexed. She was awakened from anesthesia  having tolerated the procedure without apparent complication. She was  returned to the recovery room in stable condition. At the conclusion of the procedure, needle, instruments, and sponge  counts were correct.         Ronaldo Maldonado MD    D: 06/11/2020 14:17:10       T: 06/11/2020 16:28:03     [email or web address]  Job#: [id number]     Doc#: [id number]    CC:

## 2020-06-22 ENCOUNTER — TELEPHONE (OUTPATIENT)
Dept: ORTHOPEDIC SURGERY | Age: 80
End: 2020-06-22

## 2020-06-24 ENCOUNTER — OFFICE VISIT (OUTPATIENT)
Dept: ORTHOPEDIC SURGERY | Age: 80
End: 2020-06-24

## 2020-06-24 VITALS — TEMPERATURE: 97.3 F | WEIGHT: 132 LBS | BODY MASS INDEX: 23.39 KG/M2 | HEIGHT: 63 IN

## 2020-06-24 PROCEDURE — 99024 POSTOP FOLLOW-UP VISIT: CPT | Performed by: ORTHOPAEDIC SURGERY

## 2020-06-24 NOTE — Clinical Note
Dear  Rosabel Riedel, MD,  Thank you very much for your referral or Ms. Isis Mg to me for evaluation and treatment of her Hand & Wrist condition. I appreciate your confidence in me and thank you for allowing me the opportunity to care for your patients. If I can be of any further assistance to you on this or any other patient, please do not hesitate to contact me. Sincerely,  Kristin Arango.  Lalit Watters MD

## 2020-07-02 ENCOUNTER — TELEPHONE (OUTPATIENT)
Dept: ORTHOPEDIC SURGERY | Age: 80
End: 2020-07-02

## 2020-07-02 NOTE — TELEPHONE ENCOUNTER
Urgent Care called requesting we see pt for cast check/removal. Explained CBW was out of office and we are unable to remove without physician present. I called and spoke with pt's . He stated that the pt gets irritated and frustrated with the cast around this time of day. Explained unfortunately even if we could see her to remove the cast, we would have to replace it until f/u appointment on Monday. He said they got a Tramadol rx from pcp and believed pt would be ok until appt Monday.

## 2020-07-03 ENCOUNTER — HOSPITAL ENCOUNTER (EMERGENCY)
Age: 80
Discharge: HOME | End: 2020-07-03
Attending: EMERGENCY MEDICINE
Payer: MEDICARE

## 2020-07-03 VITALS
HEIGHT: 63 IN | WEIGHT: 120.59 LBS | HEART RATE: 61 BPM | SYSTOLIC BLOOD PRESSURE: 154 MMHG | TEMPERATURE: 98 F | DIASTOLIC BLOOD PRESSURE: 71 MMHG | OXYGEN SATURATION: 96 % | BODY MASS INDEX: 21.37 KG/M2 | RESPIRATION RATE: 18 BRPM

## 2020-07-03 PROCEDURE — 93005 ELECTROCARDIOGRAM TRACING: CPT

## 2020-07-03 PROCEDURE — 99283 EMERGENCY DEPT VISIT LOW MDM: CPT

## 2020-07-03 RX ORDER — ACETAMINOPHEN AND CODEINE PHOSPHATE 300; 30 MG/1; MG/1
1 TABLET ORAL EVERY 4 HOURS PRN
Qty: 18 TABLET | Refills: 0 | Status: SHIPPED | OUTPATIENT
Start: 2020-07-03 | End: 2020-07-06

## 2020-07-03 NOTE — ED NOTES
Discharge instructions reviewed with pt and pt denied having any questions. Discharge paperwork signed and pt discharged.         Meagan Kemp RN  07/03/20 0394

## 2020-07-06 ENCOUNTER — OFFICE VISIT (OUTPATIENT)
Dept: ORTHOPEDIC SURGERY | Age: 80
End: 2020-07-06

## 2020-07-06 VITALS — BODY MASS INDEX: 21.26 KG/M2 | RESPIRATION RATE: 18 BRPM | HEIGHT: 63 IN | WEIGHT: 120 LBS

## 2020-07-06 LAB
EKG ATRIAL RATE: 73 BPM
EKG DIAGNOSIS: NORMAL
EKG P AXIS: 45 DEGREES
EKG P-R INTERVAL: 308 MS
EKG Q-T INTERVAL: 408 MS
EKG QRS DURATION: 116 MS
EKG QTC CALCULATION (BAZETT): 449 MS
EKG R AXIS: 40 DEGREES
EKG T AXIS: 53 DEGREES
EKG VENTRICULAR RATE: 73 BPM

## 2020-07-06 PROCEDURE — 99024 POSTOP FOLLOW-UP VISIT: CPT | Performed by: ORTHOPAEDIC SURGERY

## 2020-07-06 NOTE — PROGRESS NOTES
Ms. Patricia Kelly returns today in follow-up of her recent left Small Finger . mcp dislocation 4 weeks ago. She underwent a closed reduction and has been immobilized since. .  She has noted absent discomfort and decreased swelling. She notes no symptoms of numbness, tingling, no symptoms related to perfusion. Physical Exam:  Skin (after immobilization is removed) is Skin color, texture, turgor normal. No rashes or lesions. Digits: diminished range of motion  With some joint stiffness  Wrist range of motion is mildly stiff as well. Sensation is normal.  Vascular examination reveals normal and good capillary refill. Swelling is minimal.  There is no clinical evidence of residual skeletal deformity. There is no rotational deformity. metacarpo-phalangeal joint site is not tender to palpation. There is  Instability at the previous injury site. Radiographic Evaluation:  Radiographs were obtained today (3 views of the left hand). They demonstrate excellent maintenance of alignment of the metacarpo-phalangeal joint     Impression:  Ms. Patricia Kelly is doing well after recent left Small Finger metacarpo-phalangeal joint dislocation. It would appear that she has fully healed her injury. Plan:  Ms. Patricia Kelly is instructed in the appropriate short term protection of her freshly healed injury. We discussed the use of either a removable protective orthosis or bk taping technique as the situation demands. She is demonstrated the application and use of both devices. She is also instructed in work on Active & Passive range of motion of the digits, wrist, & elbow. These modalities were demonstrated to her today. We discussed the continued restrictions on the use of the injured hand and the limitations on resumption of activities until full range of motion and comfort have been regained.   I have explained the time course and likely expectations for maximal recovery of motion and

## 2020-07-06 NOTE — PATIENT INSTRUCTIONS
Hand Range of Motion Instructions      Dr. Harleen Victor    1. Be cautions in resuming fulll activities and use of the hand for next 2 - 4 weeks. 2. Perform the following exercises VIGOROUSLY at least four times a day. Exercises should be performed in the seated position with elbow on tabletop or other firm surface. If you cannot make these motions on your own, you may use other hand to assist in making these motions. A. Fully straighten fingers until hand is flat. Fully bend fingers until hand is in a full fist.   B. Bend wrist forward and backward (grasp hand around knuckles with other hand to do so). C. Rotate forearm so that your palm faces towards your face. Rotate forearm so that your palm faces away from your face (grasp hand around wrist with other hand to do so). D. Fully straighten elbow. Fully bend elbow. 3. Continue light use of the hand progressing to more normal us as it feels comfortable to do so. 4. In 2 - 4 weeks you may discontinue using the brace (if you were using one) and resume normal use of the hand and wrist if you have regained full and painless motion and function. 5. If you are unable to achieve  full and painless motion and function over 4 weeks, please call the office at [phone] to schedule a follow-up appointment with Dr. Merrick Carranza. Thank you for choosing [facility]) Physicians for your Hand and Upper Extremity needs. If we can be of any further assistance to you, please do not hesitate to contact us.     Office Phone Number:  [phone]  or  [phone]

## 2020-07-08 NOTE — ED PROVIDER NOTES
CHIEF COMPLAINT  Hand Problem (Left hand,  states that the hand in the cast has been bothering the patient, states that he wasn't sure if she was having trouble breathing earlier or not and really isn't sure why they're here.)      HISTORY OF PRESENT ILLNESS  Shanelle Fu is a [de-identified] y.o. female who presents to the ED complaining of pain in the left hand,  states the hand was in a cast has been bothering her although the pain is not any different than what it is been going on. Thought that she was having trouble breathing earlier but thinks it might of been secondary to pain. She has a history of dementia and is not a good historian most of the history is gathered from the . .   No other complaints, modifying factors or associated symptoms. Nursing notes reviewed. Past Medical History:   Diagnosis Date    Dementia ([address]     Irregular heart beat     Mitral valve prolapse      Past Surgical History:   Procedure Laterality Date    BREAST BIOPSY Left     HAND SURGERY Left 6/11/2020    CLOSED REDUCTION LEFT SMALL FINGER DISLOCATION OF METACARPOPHALANGEAL JOINT performed by Juno Beauchamp MD at . [place] [id number] Left      History reviewed. No pertinent family history.   Social History     Socioeconomic History    Marital status:      Spouse name: Not on file    Number of children: Not on file    Years of education: Not on file    Highest education level: Not on file   Occupational History    Not on file   Social Needs    Financial resource strain: Not on file    Food insecurity     Worry: Not on file     Inability: Not on file    Transportation needs     Medical: Not on file     Non-medical: Not on file   Tobacco Use    Smoking status: Never Smoker    Smokeless tobacco: Never Used   Substance and Sexual Activity    Alcohol use: No    Drug use: Never    Sexual activity: Not Currently   Lifestyle    Physical activity     Days per week: Not on file     Minutes per session: Not on file    Stress: Not on file   Relationships    Social connections     Talks on phone: Not on file     Gets together: Not on file     Attends [employer] service: Not on file     Active member of club or organization: Not on file     Attends meetings of clubs or organizations: Not on file     Relationship status: Not on file    Intimate partner violence     Fear of current or ex partner: Not on file     Emotionally abused: Not on file     Physically abused: Not on file     Forced sexual activity: Not on file   Other Topics Concern    Not on file   Social History Narrative    Not on file     No current facility-administered medications for this encounter. Current Outpatient Medications   Medication Sig Dispense Refill    donepezil (ARICEPT) 10 MG tablet Take 10 mg by mouth nightly      sertraline (ZOLOFT) 25 MG tablet Take 25 mg by mouth daily      calcium carbonate-vitamin D (CALTRATE) 600-400 MG-UNIT TABS per tab Take 1 tablet by mouth daily      Cyanocobalamin (VITAMIN B-12) 5000 MCG SUBL Place 5,000 mcg under the tongue daily      ASPIRIN 81 PO Take 81 mg by mouth daily      latanoprost (XALATAN) 0.005 % ophthalmic solution Apply 1 drop to eye nightly      brimonidine (ALPHAGAN) 0.2 % ophthalmic solution Apply 1 drop to eye 2 times daily      metoprolol tartrate (LOPRESSOR) 25 MG tablet Take 25 mg by mouth 2 times daily       No Known Allergies    REVIEW OF SYSTEMS  6 systems reviewed, pertinent positives per HPI otherwise noted to be negative    PHYSICAL EXAM  BP (!) 154/71   Pulse 61   Temp 98 °F (36.7 °C) (Oral)   Resp 18   Ht 5' 3\" (1.6 m)   Wt 120 lb 9.5 oz (54.7 kg)   SpO2 96%   BMI 21.36 kg/m²   GENERAL APPEARANCE: Awake and alert. Cooperative. No acute distress. HEAD: Normocephalic. Atraumatic. EYES: PERRL. EOM's grossly intact. ENT: Mucous membranes are moist.   NECK: Supple. Normal ROM.    HEART: RRR,  no murmur/rubs/gallop  CHEST/LUNGS: Breathing is unlabored. Speaking comfortably in full sentences. EXTREMITIES: .  Left hand in cast up to mid forearm. . All extremities neurovascularly intact. SKIN: Warm and dry. NEUROLOGICAL: Alert and oriented. Normal coordination. Gait normal.     ED COURSE/MDM  Patient seen and evaluated. I discussed results and plan of care with patient . I do feel patient can be safely discharged to home. Recommend follow up with PCP in 2-3 days for re-evaluation. Reasons to RT ED discussed. Patient expresses understanding and is in agreement with plan. Discharge Medication List as of 7/3/2020  3:27 AM      START taking these medications    Details   acetaminophen-codeine (TYLENOL/CODEINE #3) 300-30 MG per tablet Take 1 tablet by mouth every 4 hours as needed for Pain for up to 3 days. Intended supply: 5 days. Take lowest dose possible to manage pain, Disp-18 tablet, R-0Print           CLINICAL IMPRESSION  1. Left hand pain        Blood pressure (!) 154/71, pulse 61, temperature 98 °F (36.7 °C), temperature source Oral, resp. rate 18, height 5' 3\" (1.6 m), weight 120 lb 9.5 oz (54.7 kg), SpO2 96 %. DISPOSITION  Patient was discharged to home in good condition. This chart was generated in part by using Dragon Dictation system and may contain errors related to that system including errors in grammar, punctuation, and spelling, as well as words and phrases that may be inappropriate. When dictating, effort is made to correct spelling/grammar errors. If there are any questions or concerns please feel free to contact the dictating provider for clarification.      Mau Alexander DO  ATTENDING, [address] [place] [address],   07/08/20 29

## 2020-08-11 ENCOUNTER — APPOINTMENT (OUTPATIENT)
Dept: ULTRASOUND IMAGING | Age: 80
End: 2020-08-11
Payer: MEDICARE

## 2020-08-11 ENCOUNTER — HOSPITAL ENCOUNTER (OUTPATIENT)
Dept: WOMENS IMAGING | Age: 80
Discharge: HOME | End: 2020-08-11
Payer: MEDICARE

## 2020-08-11 PROCEDURE — 77065 DX MAMMO INCL CAD UNI: CPT

## 2020-11-10 ENCOUNTER — HOSPITAL ENCOUNTER (EMERGENCY)
Age: 80
Discharge: HOME | End: 2020-11-10
Attending: EMERGENCY MEDICINE
Payer: MEDICARE

## 2020-11-10 ENCOUNTER — APPOINTMENT (OUTPATIENT)
Dept: GENERAL RADIOLOGY | Age: 80
End: 2020-11-10
Payer: MEDICARE

## 2020-11-10 VITALS
OXYGEN SATURATION: 100 % | DIASTOLIC BLOOD PRESSURE: 73 MMHG | WEIGHT: 115.96 LBS | HEART RATE: 63 BPM | SYSTOLIC BLOOD PRESSURE: 144 MMHG | BODY MASS INDEX: 20.54 KG/M2 | TEMPERATURE: 98.3 F | RESPIRATION RATE: 17 BRPM

## 2020-11-10 LAB
A/G RATIO: 1.7 (ref 1.1–2.2)
ALBUMIN SERPL-MCNC: 3.8 G/DL (ref 3.4–5)
ALP BLD-CCNC: 84 U/L (ref 40–129)
ALT SERPL-CCNC: 11 U/L (ref 10–40)
ANION GAP SERPL CALCULATED.3IONS-SCNC: 9 MMOL/L (ref 3–16)
AST SERPL-CCNC: 16 U/L (ref 15–37)
BASOPHILS ABSOLUTE: 0.1 K/UL (ref 0–0.2)
BASOPHILS RELATIVE PERCENT: 1 %
BILIRUB SERPL-MCNC: 0.4 MG/DL (ref 0–1)
BUN BLDV-MCNC: 19 MG/DL (ref 7–20)
CALCIUM SERPL-MCNC: 9.3 MG/DL (ref 8.3–10.6)
CHLORIDE BLD-SCNC: 106 MMOL/L (ref 99–110)
CO2: 26 MMOL/L (ref 21–32)
CREAT SERPL-MCNC: 0.8 MG/DL (ref 0.6–1.2)
EKG ATRIAL RATE: 65 BPM
EKG DIAGNOSIS: NORMAL
EKG P AXIS: 3 DEGREES
EKG P-R INTERVAL: 256 MS
EKG Q-T INTERVAL: 404 MS
EKG QRS DURATION: 106 MS
EKG QTC CALCULATION (BAZETT): 420 MS
EKG R AXIS: 73 DEGREES
EKG T AXIS: 68 DEGREES
EKG VENTRICULAR RATE: 65 BPM
EOSINOPHILS ABSOLUTE: 0.3 K/UL (ref 0–0.6)
EOSINOPHILS RELATIVE PERCENT: 5.2 %
GFR AFRICAN AMERICAN: >60
GFR NON-AFRICAN AMERICAN: >60
GLOBULIN: 2.3 G/DL
GLUCOSE BLD-MCNC: 97 MG/DL (ref 70–99)
HCT VFR BLD CALC: 37.6 % (ref 36–48)
HEMOGLOBIN: 12.5 G/DL (ref 12–16)
LYMPHOCYTES ABSOLUTE: 0.9 K/UL (ref 1–5.1)
LYMPHOCYTES RELATIVE PERCENT: 17.8 %
MCH RBC QN AUTO: 31.3 PG (ref 26–34)
MCHC RBC AUTO-ENTMCNC: 33.1 G/DL (ref 31–36)
MCV RBC AUTO: 94.5 FL (ref 80–100)
MONOCYTES ABSOLUTE: 0.5 K/UL (ref 0–1.3)
MONOCYTES RELATIVE PERCENT: 9.5 %
NEUTROPHILS ABSOLUTE: 3.4 K/UL (ref 1.7–7.7)
NEUTROPHILS RELATIVE PERCENT: 66.5 %
PDW BLD-RTO: 13.9 % (ref 12.4–15.4)
PLATELET # BLD: 193 K/UL (ref 135–450)
PMV BLD AUTO: 9.5 FL (ref 5–10.5)
POTASSIUM REFLEX MAGNESIUM: 4 MMOL/L (ref 3.5–5.1)
RBC # BLD: 3.98 M/UL (ref 4–5.2)
SODIUM BLD-SCNC: 141 MMOL/L (ref 136–145)
TOTAL PROTEIN: 6.1 G/DL (ref 6.4–8.2)
TROPONIN: <0.01 NG/ML
WBC # BLD: 5.2 K/UL (ref 4–11)

## 2020-11-10 PROCEDURE — 93010 ELECTROCARDIOGRAM REPORT: CPT | Performed by: INTERNAL MEDICINE

## 2020-11-10 PROCEDURE — 36415 COLL VENOUS BLD VENIPUNCTURE: CPT

## 2020-11-10 PROCEDURE — 71046 X-RAY EXAM CHEST 2 VIEWS: CPT

## 2020-11-10 PROCEDURE — 84484 ASSAY OF TROPONIN QUANT: CPT

## 2020-11-10 PROCEDURE — 80053 COMPREHEN METABOLIC PANEL: CPT

## 2020-11-10 PROCEDURE — 99283 EMERGENCY DEPT VISIT LOW MDM: CPT

## 2020-11-10 PROCEDURE — 93005 ELECTROCARDIOGRAM TRACING: CPT | Performed by: EMERGENCY MEDICINE

## 2020-11-10 PROCEDURE — 85025 COMPLETE CBC W/AUTO DIFF WBC: CPT

## 2020-11-10 NOTE — ED PROVIDER NOTES
[address] [facility]      Pt Name: Rajan Archer  MRN: [id number]  [place] 1940  Date of evaluation: 11/10/2020  Provider: Boaz Zavaleat MD    CHIEF COMPLAINT       Chief Complaint   Patient presents with    Shortness of Breath     pt came in with  after having an episode of shortness of breath after waking up from sleep today     HISTORY OF PRESENT ILLNESS  (Location/Symptom, Timing/Onset,Context/Setting, Quality, Duration, Modifying Factors, Severity). Note limiting factors. Rajan Archer is a [de-identified] y.o. female who presents to the emergency department secondary to concern for shortness of breath that happened when she woke up from sleep. She is pleasant and answers basic questions though has a history of dementia and significant collateral is from . She does report she currently feels 100% fine, ridiculous for being here in her nightgown, denies any chest pain, trouble breathing.  at the bedside reports he heard her breathing more rapidly and woke her from sleep. He states they did not wait but rather brought her to the emergency department for further evaluation right away. He denies any recent trauma or falls. He denies any recent fevers, chills, nausea, vomiting, abdominal pain, chest pain reports from her. No new medications. Has been taking her medications as prescribed otherwise. When asked again if she had any symptoms she denies though  does interject and state when he woke her up she did report some nausea. Continue to deny any vomiting. Past medical history noted below, significant for dementia, irregular heart beat, mitral valve prolapse. Aside from what is stated above denies any other symptoms or modifying factors. Nursing Notes reviewed.     REVIEW OF SYSTEMS  (2-9 systems for level 4, 10 or more for level 5)   Review of Systems  Pertinent positive and negative findings as documented in the HPI; otherwise all other systems were reviewed and were negative. PAST MEDICAL HISTORY     Past Medical History:   Diagnosis Date    Dementia ([address]     Irregular heart beat     Mitral valve prolapse        SURGICALHISTORY       Past Surgical History:   Procedure Laterality Date    BREAST BIOPSY Left     HAND SURGERY Left 6/11/2020    CLOSED REDUCTION LEFT SMALL FINGER DISLOCATION OF METACARPOPHALANGEAL JOINT performed by Cooper Russell MD at . [address] Left      CURRENT MEDICATIONS       Previous Medications    ASPIRIN 81 PO    Take 81 mg by mouth daily    BRIMONIDINE (ALPHAGAN) 0.2 % OPHTHALMIC SOLUTION    Apply 1 drop to eye 2 times daily    CALCIUM CARBONATE-VITAMIN D (CALTRATE) 600-400 MG-UNIT TABS PER TAB    Take 1 tablet by mouth daily    CYANOCOBALAMIN (VITAMIN B-12) 5000 MCG SUBL    Place 5,000 mcg under the tongue daily    DONEPEZIL (ARICEPT) 10 MG TABLET    Take 10 mg by mouth nightly    LATANOPROST (XALATAN) 0.005 % OPHTHALMIC SOLUTION    Apply 1 drop to eye nightly    METOPROLOL TARTRATE (LOPRESSOR) 25 MG TABLET    Take 25 mg by mouth 2 times daily    SERTRALINE (ZOLOFT) 25 MG TABLET    Take 25 mg by mouth daily      ALLERGIES       Patient has no known allergies. FAMILY HISTORY       History reviewed. No pertinent family history.   SOCIAL HISTORY        Social History     Socioeconomic History    Marital status:      Spouse name: None    Number of children: None    Years of education: None    Highest education level: None   Occupational History    None   Social Needs    Financial resource strain: None    Food insecurity     Worry: None     Inability: None    Transportation needs     Medical: None     Non-medical: None   Tobacco Use    Smoking status: Never Smoker    Smokeless tobacco: Never Used   Substance and Sexual Activity    Alcohol use: No    Drug use: Never    Sexual activity: Not Currently   Lifestyle    Physical activity     Days per week: None     Minutes per session: None    Stress: None   Relationships    Social connections     Talks on phone: None     Gets together: None     Attends Taoist service: None     Active member of club or organization: None     Attends meetings of clubs or organizations: None     Relationship status: None    Intimate partner violence     Fear of current or ex partner: None     Emotionally abused: None     Physically abused: None     Forced sexual activity: None   Other Topics Concern    None   Social History Narrative    None     SCREENINGS         PHYSICAL EXAM  (up to 7 for level 4, 8 or more for level 5)   INITIAL VITALS: BP: (!) 144/73, Temp: 98.3 °F (36.8 °C), Pulse: 63, Resp: 17, SpO2: 100 %   Physical Exam  Constitutional:       General: She is not in acute distress. Appearance: She is normal weight. She is not ill-appearing or toxic-appearing. HENT:      Head: Normocephalic and atraumatic. Right Ear: External ear normal.      Left Ear: External ear normal.      Mouth/Throat:      Mouth: Mucous membranes are moist.   Eyes:      General: No scleral icterus. Conjunctiva/sclera: Conjunctivae normal.   Neck:      Musculoskeletal: Normal range of motion. Trachea: No tracheal deviation. Cardiovascular:      Rate and Rhythm: Normal rate. Heart sounds: Murmur present. Pulmonary:      Effort: Pulmonary effort is normal. No respiratory distress. Breath sounds: No decreased breath sounds or wheezing. Chest:      Chest wall: No edema. Musculoskeletal:      Right lower leg: She exhibits no tenderness. No edema. Left lower leg: She exhibits no tenderness. No edema. Skin:     General: Skin is warm and dry. Capillary Refill: Capillary refill takes less than 2 seconds. Neurological:      General: No focal deficit present. Mental Status: She is alert. Mental status is at baseline.    Psychiatric:         Mood and Affect: Mood normal.         Behavior: Behavior normal.       DIAGNOSTIC RESULTS   EKG: All EKG's are interpreted by the Emergency Department Physician who either signs or Co-signs this chart in the absence of a cardiologist.  Indication: Shortness of breath  Interpretation: Rate 65, rhythm sinus, axis normal.  DE prolonged 256, , QTc within normal limits. She has a pacemaker and appears to be atrially paced. Comparison to prior EKG from July 3, 2020 shows no acute ischemic changes noted. RADIOLOGY:   Interpretation per Radiologist below, if available at the time of this note:  XR CHEST (2 VW)   Final Result   No acute findings. Hyperinflated lungs suggests underlying obstructive small airways disease   such as asthma or COPD. LABS:  Labs Reviewed   CBC WITH AUTO DIFFERENTIAL - Abnormal; Notable for the following components:       Result Value    RBC 3.98 (*)     Lymphocytes Absolute 0.9 (*)     All other components within normal limits    Narrative:     Performed at:  [address] [place] [address]   Phone [phone]   COMPREHENSIVE METABOLIC PANEL W/ REFLEX TO MG FOR LOW K - Abnormal; Notable for the following components: Total Protein 6.1 (*)     All other components within normal limits    Narrative:     Performed at:  [facility]  [address] [facility] [place] [address]   Phone [phone]   TROPONIN    Narrative:     Performed at:  [facility]  [address] [place], [place] [address] [address]   Phone [phone]       All other labs were within normal range or not returned as of this dictation. EMERGENCY DEPARTMENT COURSE and DIFFERENTIAL DIAGNOSIS/MDM:   Patient was given the following medications:  No orders of the defined types were placed in this encounter.     CONSULTS:  None  INITIAL VITALS: BP: (!) 144/73, Temp: 98.3 °F (36.8 °C), Pulse: 63, Resp: 17, SpO2: 100 %   RECENT VITALS:  BP: (!) 144/73,Temp: 98.3 °F (36.8 °C), Pulse: 63, Resp: 17, SpO2: 100 %     Cindy Gentile is a [de-identified] y.o. female presents to the emergency department with her  secondary to concern for change in breathing pattern overnight and after waking up having intermittent shortness of breath with a little nausea. On arrival here she has no symptoms. Vitals notable for blood pressure 144/73 otherwise hemodynamically stable and within normal limits. On exam she has clear lungs, benign abdomen, no peripheral edema noted. She has no focal neurologic deficits. After discussion with the  and the patient regarding further work-up they declined Covid testing though stated since they were here they would appreciate getting some basic labs to make sure her electrolytes were within normal limits. We also did an EKG which showed no signs of acute ischemia. Chest x-ray has no acute findings. Labs returned largely unremarkable including a negative troponin. Discussed results with the patient and the . They verbalized understanding. Discussed following up with primary care as well as return precautions which  stated understanding of prior to discharge. FINAL IMPRESSION      1. Rapid breathing        DISPOSITION/PLAN   DISPOSITION        PATIENT REFERRED TO:  Ramin Peña MD  [address] [facility].   [address] [facility] [address]  [phone]    Schedule an appointment as soon as possible for a visit   For follow up appointment      DISCHARGE MEDICATIONS:  New Prescriptions    No medications on file            (Please note that portions of this note were completed with a voice recognition program. Efforts were made to edit the dictations but occasionally words are mis-transcribed.)    Belinda Vang MD (electronically signed)  Attending Emergency Physician        Belinda Vang MD  11/10/20 [id number]

## 2020-11-23 NOTE — ED TRIAGE NOTES
Patient was a walk in via private vehicle with . Patient was breathing 'rapidly' in her sleep according to the ,  recalls he may have woken her up at this time. When patient awoke she states she did feel short of breath with some nausea. This episode occurred at approximately 0230 or shortly before. Patient currently denies shortness of breath, denies chest pain. Has been without cough, congestion or any sick contacts. Patient without bowel, or urinary complaints. Patient has a pacemaker, which on telemetry monitoring appears well functioning, keeping patient at a consistent paced sinus rhythm of 64 bpm. Patient has slight dementia, but overall is alert and oriented, with some minor focus issues.  appears to be a good historian and care provider for the patient. No

## 2021-02-09 ENCOUNTER — HOSPITAL ENCOUNTER (EMERGENCY)
Age: 81
Discharge: HOME | End: 2021-02-09
Attending: STUDENT IN AN ORGANIZED HEALTH CARE EDUCATION/TRAINING PROGRAM
Payer: MEDICARE

## 2021-02-09 ENCOUNTER — APPOINTMENT (OUTPATIENT)
Dept: GENERAL RADIOLOGY | Age: 81
End: 2021-02-09
Payer: MEDICARE

## 2021-02-09 VITALS
DIASTOLIC BLOOD PRESSURE: 54 MMHG | WEIGHT: 115.74 LBS | HEIGHT: 63 IN | TEMPERATURE: 97 F | HEART RATE: 68 BPM | SYSTOLIC BLOOD PRESSURE: 140 MMHG | RESPIRATION RATE: 15 BRPM | OXYGEN SATURATION: 100 % | BODY MASS INDEX: 20.51 KG/M2

## 2021-02-09 DIAGNOSIS — R55 SYNCOPE AND COLLAPSE: Primary | ICD-10-CM

## 2021-02-09 LAB
A/G RATIO: 1.5 (ref 1.1–2.2)
ALBUMIN SERPL-MCNC: 4.1 G/DL (ref 3.4–5)
ALP BLD-CCNC: 91 U/L (ref 40–129)
ALT SERPL-CCNC: 11 U/L (ref 10–40)
ANION GAP SERPL CALCULATED.3IONS-SCNC: 10 MMOL/L (ref 3–16)
AST SERPL-CCNC: 15 U/L (ref 15–37)
BACTERIA: ABNORMAL /HPF
BASE EXCESS VENOUS: 2.7 MMOL/L
BASOPHILS ABSOLUTE: 0 K/UL (ref 0–0.2)
BASOPHILS RELATIVE PERCENT: 0.2 %
BILIRUB SERPL-MCNC: 0.4 MG/DL (ref 0–1)
BILIRUBIN URINE: NEGATIVE
BLOOD, URINE: NEGATIVE
BUN BLDV-MCNC: 17 MG/DL (ref 7–20)
CALCIUM SERPL-MCNC: 10 MG/DL (ref 8.3–10.6)
CARBOXYHEMOGLOBIN: 1.2 %
CHLORIDE BLD-SCNC: 102 MMOL/L (ref 99–110)
CLARITY: ABNORMAL
CO2: 28 MMOL/L (ref 21–32)
COLOR: ABNORMAL
CREAT SERPL-MCNC: 0.9 MG/DL (ref 0.6–1.2)
CRYSTALS, UA: ABNORMAL /HPF
EKG ATRIAL RATE: 81 BPM
EKG DIAGNOSIS: NORMAL
EKG P AXIS: 36 DEGREES
EKG P-R INTERVAL: 328 MS
EKG Q-T INTERVAL: 388 MS
EKG QRS DURATION: 94 MS
EKG QTC CALCULATION (BAZETT): 450 MS
EKG R AXIS: 59 DEGREES
EKG T AXIS: 52 DEGREES
EKG VENTRICULAR RATE: 81 BPM
EOSINOPHILS ABSOLUTE: 0 K/UL (ref 0–0.6)
EOSINOPHILS RELATIVE PERCENT: 0.4 %
EPITHELIAL CELLS, UA: 22 /HPF (ref 0–5)
FINE CASTS, UA: ABNORMAL /LPF (ref 0–2)
GFR AFRICAN AMERICAN: >60
GFR NON-AFRICAN AMERICAN: >60
GLOBULIN: 2.7 G/DL
GLUCOSE BLD-MCNC: 96 MG/DL (ref 70–99)
GLUCOSE URINE: NEGATIVE MG/DL
HCO3 VENOUS: 30 MMOL/L (ref 23–29)
HCT VFR BLD CALC: 41.7 % (ref 36–48)
HEMOGLOBIN: 13.7 G/DL (ref 12–16)
HYALINE CASTS: ABNORMAL /LPF (ref 0–2)
KETONES, URINE: ABNORMAL MG/DL
LACTIC ACID: 2.1 MMOL/L (ref 0.4–2)
LEUKOCYTE ESTERASE, URINE: ABNORMAL
LYMPHOCYTES ABSOLUTE: 0.6 K/UL (ref 1–5.1)
LYMPHOCYTES RELATIVE PERCENT: 9.4 %
MCH RBC QN AUTO: 31.3 PG (ref 26–34)
MCHC RBC AUTO-ENTMCNC: 33 G/DL (ref 31–36)
MCV RBC AUTO: 94.8 FL (ref 80–100)
METHEMOGLOBIN VENOUS: 0.6 %
MICROSCOPIC EXAMINATION: YES
MONOCYTES ABSOLUTE: 0.3 K/UL (ref 0–1.3)
MONOCYTES RELATIVE PERCENT: 4.4 %
NEUTROPHILS ABSOLUTE: 5.3 K/UL (ref 1.7–7.7)
NEUTROPHILS RELATIVE PERCENT: 85.6 %
NITRITE, URINE: NEGATIVE
O2 CONTENT, VEN: 6 ML/DL
O2 SAT, VEN: 33 %
O2 THERAPY: ABNORMAL
PCO2, VEN: 56.5 MMHG (ref 40–50)
PDW BLD-RTO: 13.5 % (ref 12.4–15.4)
PH UA: 6 (ref 5–8)
PH VENOUS: 7.33 (ref 7.35–7.45)
PLATELET # BLD: 205 K/UL (ref 135–450)
PMV BLD AUTO: 9 FL (ref 5–10.5)
PO2, VEN: 22 MMHG
POTASSIUM REFLEX MAGNESIUM: 3.9 MMOL/L (ref 3.5–5.1)
PROTEIN UA: 30 MG/DL
RBC # BLD: 4.39 M/UL (ref 4–5.2)
RBC UA: ABNORMAL /HPF (ref 0–4)
SODIUM BLD-SCNC: 140 MMOL/L (ref 136–145)
SPECIFIC GRAVITY UA: 1.02 (ref 1–1.03)
TCO2 CALC VENOUS: 32 MMOL/L
TOTAL PROTEIN: 6.8 G/DL (ref 6.4–8.2)
TROPONIN: <0.01 NG/ML
URINE REFLEX TO CULTURE: ABNORMAL
URINE TYPE: ABNORMAL
UROBILINOGEN, URINE: 0.2 E.U./DL
WBC # BLD: 6.2 K/UL (ref 4–11)
WBC UA: 9 /HPF (ref 0–5)

## 2021-02-09 PROCEDURE — 81001 URINALYSIS AUTO W/SCOPE: CPT

## 2021-02-09 PROCEDURE — 71046 X-RAY EXAM CHEST 2 VIEWS: CPT

## 2021-02-09 PROCEDURE — 83605 ASSAY OF LACTIC ACID: CPT

## 2021-02-09 PROCEDURE — 96365 THER/PROPH/DIAG IV INF INIT: CPT

## 2021-02-09 PROCEDURE — 99282 EMERGENCY DEPT VISIT SF MDM: CPT

## 2021-02-09 PROCEDURE — 2580000003 HC RX 258: Performed by: STUDENT IN AN ORGANIZED HEALTH CARE EDUCATION/TRAINING PROGRAM

## 2021-02-09 PROCEDURE — 82803 BLOOD GASES ANY COMBINATION: CPT

## 2021-02-09 PROCEDURE — 93010 ELECTROCARDIOGRAM REPORT: CPT | Performed by: INTERNAL MEDICINE

## 2021-02-09 PROCEDURE — 93005 ELECTROCARDIOGRAM TRACING: CPT | Performed by: STUDENT IN AN ORGANIZED HEALTH CARE EDUCATION/TRAINING PROGRAM

## 2021-02-09 PROCEDURE — 80053 COMPREHEN METABOLIC PANEL: CPT

## 2021-02-09 PROCEDURE — 84484 ASSAY OF TROPONIN QUANT: CPT

## 2021-02-09 PROCEDURE — 6360000002 HC RX W HCPCS: Performed by: STUDENT IN AN ORGANIZED HEALTH CARE EDUCATION/TRAINING PROGRAM

## 2021-02-09 PROCEDURE — 85025 COMPLETE CBC W/AUTO DIFF WBC: CPT

## 2021-02-09 RX ORDER — SODIUM CHLORIDE, SODIUM LACTATE, POTASSIUM CHLORIDE, AND CALCIUM CHLORIDE .6; .31; .03; .02 G/100ML; G/100ML; G/100ML; G/100ML
500 INJECTION, SOLUTION INTRAVENOUS ONCE
Status: COMPLETED | OUTPATIENT
Start: 2021-02-09 | End: 2021-02-09

## 2021-02-09 RX ORDER — CEFUROXIME AXETIL 250 MG/1
250 TABLET ORAL 2 TIMES DAILY
Qty: 14 TABLET | Refills: 0 | Status: SHIPPED | OUTPATIENT
Start: 2021-02-09 | End: 2021-02-16

## 2021-02-09 RX ADMIN — SODIUM CHLORIDE, POTASSIUM CHLORIDE, SODIUM LACTATE AND CALCIUM CHLORIDE 500 ML: 600; 310; 30; 20 INJECTION, SOLUTION INTRAVENOUS at 13:00

## 2021-02-09 RX ADMIN — SODIUM CHLORIDE, POTASSIUM CHLORIDE, SODIUM LACTATE AND CALCIUM CHLORIDE 500 ML: 600; 310; 30; 20 INJECTION, SOLUTION INTRAVENOUS at 14:08

## 2021-02-09 RX ADMIN — CEFTRIAXONE 1000 MG: 1 INJECTION, POWDER, FOR SOLUTION INTRAMUSCULAR; INTRAVENOUS at 14:44

## 2021-02-09 NOTE — ED TRIAGE NOTES
Pt to ED after fainting this am while sitting on edge of bed. Pt was sitting on bed and leaned over and was being held by pts .  states pt regained her lucidity about one minute later. Pt denies pain.

## 2021-02-09 NOTE — ED PROVIDER NOTES
[address] [facility]      Pt Name: Yoalnda Casas  MRN: [id number]  [place] [birth year]  Date of evaluation: 2/9/2021  Provider: Naren Mendez MD    CHIEF COMPLAINT       Chief Complaint   Patient presents with    Palpitations     pt feels as if her heart is racing. feels some sob.  stated patient fainted this morning. pt doesnt recall. denies hitting head. pt has pacemaker. HISTORY OF PRESENT ILLNESS   (Location/Symptom, Timing/Onset,Context/Setting, Quality, Duration, Modifying Factors, Severity)  Note limiting factors. Yolanda Casas is a [de-identified] y.o. female who presents to the emergency department s/p syncopal episode approx 4 hours prior to arrival.  Per , patient woke up around 7 AM this morning and then prior to getting up out of bed had an episode where she became unresponsive and he could not rouse her for approximately 1 minute, nearly falling out of bed at this time but he was able to catch her. No convulsions, no confusion following the event. The pt has dementia and does not recall the event and therefore history is limited. Her  states that she is otherwise at her baseline. He states that she appeared nauseous and looked like she would vomit but she did not. She has a [employer] pacemaker, significant h/o severe mitral regurgitation from MVP. She endorses mild SOB currently, and states she \"feels like she ran a marathon\", otherwise she has no complaints, denies chest pain, fevers, chills, current nausea, vomiting, diarrhea, abdominal pain, leg swelling. NursingNotes were reviewed. REVIEW OF SYSTEMS    (2-9 systems for level 4, 10 or more for level 5)       UTO 2/2 dementia, baseline mental status.       PAST MEDICAL HISTORY     Past Medical History:   Diagnosis Date    Dementia ([address]     Irregular heart beat     Mitral valve prolapse          SURGICALHISTORY       Past Surgical History: Procedure Laterality Date    BREAST BIOPSY Left     HAND SURGERY Left 6/11/2020    CLOSED REDUCTION LEFT SMALL FINGER DISLOCATION OF METACARPOPHALANGEAL JOINT performed by Paula Ocampo MD at . [address]          CURRENT MEDICATIONS       Previous Medications    ASPIRIN 81 PO    Take 81 mg by mouth daily    BRIMONIDINE (ALPHAGAN) 0.2 % OPHTHALMIC SOLUTION    Apply 1 drop to eye 2 times daily    CALCIUM CARBONATE-VITAMIN D (CALTRATE) 600-400 MG-UNIT TABS PER TAB    Take 1 tablet by mouth daily    CYANOCOBALAMIN (VITAMIN B-12) 5000 MCG SUBL    Place 5,000 mcg under the tongue daily    DONEPEZIL (ARICEPT) 10 MG TABLET    Take 10 mg by mouth nightly    LATANOPROST (XALATAN) 0.005 % OPHTHALMIC SOLUTION    Apply 1 drop to eye nightly    METOPROLOL TARTRATE (LOPRESSOR) 25 MG TABLET    Take 25 mg by mouth 2 times daily    SERTRALINE (ZOLOFT) 25 MG TABLET    Take 25 mg by mouth daily       ALLERGIES     Patient has no known allergies. FAMILY HISTORY     No family history on file.        SOCIAL HISTORY       Social History     Socioeconomic History    Marital status:      Spouse name: Not on file    Number of children: Not on file    Years of education: Not on file    Highest education level: Not on file   Occupational History    Not on file   Social Needs    Financial resource strain: Not on file    Food insecurity     Worry: Not on file     Inability: Not on file    Transportation needs     Medical: Not on file     Non-medical: Not on file   Tobacco Use    Smoking status: Never Smoker    Smokeless tobacco: Never Used   Substance and Sexual Activity    Alcohol use: No    Drug use: Never    Sexual activity: Not Currently   Lifestyle    Physical activity     Days per week: Not on file     Minutes per session: Not on file    Stress: Not on file   Relationships    Social connections     Talks on phone: Not on file     Gets together: Not on file Attends Confucianism service: Not on file     Active member of club or organization: Not on file     Attends meetings of clubs or organizations: Not on file     Relationship status: Not on file    Intimate partner violence     Fear of current or ex partner: Not on file     Emotionally abused: Not on file     Physically abused: Not on file     Forced sexual activity: Not on file   Other Topics Concern    Not on file   Social History Narrative    Not on file       SCREENINGS             PHYSICAL EXAM    (up to 7 for level 4, 8 or more for level 5)     ED Triage Vitals [02/09/21 1214]   BP Temp Temp Source Pulse Resp SpO2 Height Weight   128/76 97 °F (36.1 °C) Temporal 80 16 100 % 5' 3\" (1.6 m) 115 lb 11.9 oz (52.5 kg)       General: Alert and oriented appropriately for age, No acute distress. Eye: Normal conjunctiva. Pupils equal and reactive. HENT: Oral mucosa is moist.  Respiratory: Respirations even and non-labored. Lungs clear to auscultation bilaterally. Pacemaker palpable in the left upper chest, nontender to palpation, no overlying erythema. Cardiovascular: Normal rate, Regular rhythm. Grade 4 out of 6 harsh systolic murmur best heard over the apex. Intact peripheral pulses throughout. No peripheral edema. Gastrointestinal: Soft, Non-tender, Non-distended. Musculoskeletal: No swelling. No deformities. Integumentary: Warm, Dry. Neurologic: Alert and appropriate for age. No focal deficits. Psychiatric: Cooperative. DIAGNOSTIC RESULTS     EKG: All EKG's are interpreted by the Emergency Department Physician who either signs or Co-signsthis chart in the absence of a cardiologist.    The Ekg interpreted by me shows  Atrially paced rhythm at a rate of 81 bpm, AZ interval consistent with first-degree AV block. Axis is   Normal  QTc is  normal  Intervals and Durations are remarkable for a AZ interval of 328 ms from the pacer spike to the QRS complex.       ST Segments: no acute change Compared to prior EKG dated 11/10/2020, no significant ST changes however there is significant increasing duration of the MO interval.          RADIOLOGY:   Non-plain filmimages such as CT, Ultrasound and MRI are read by the radiologist. Plain radiographic images are visualized and preliminarily interpreted by the emergency physician with the below findings:      Interpretation per the Radiologist below, if available at the time ofthis note:    XR CHEST (2 VW)   Final Result   No evidence of acute cardiopulmonary disease.                LABS:  Labs Reviewed   CBC WITH AUTO DIFFERENTIAL - Abnormal; Notable for the following components:       Result Value    Lymphocytes Absolute 0.6 (*)     All other components within normal limits    Narrative:     Performed at:  [address] [employer]   Phone [phone]   BLOOD GAS, VENOUS - Abnormal; Notable for the following components:    pH, Eduard 7.334 (*)     pCO2, Eduard 56.5 (*)     HCO3, Venous 30 (*)     All other components within normal limits    Narrative:     Performed at:  [address] [employer]   Phone [phone]   LACTIC ACID, PLASMA - Abnormal; Notable for the following components:    Lactic Acid 2.1 (*)     All other components within normal limits    Narrative:     Performed at:  [address] [employer] [address]   Phone [phone]   URINE RT REFLEX TO CULTURE - Abnormal; Notable for the following components:    Clarity, UA CLOUDY (*)     Ketones, Urine TRACE (*)     Protein, UA 30 (*)     Leukocyte Esterase, Urine SMALL (*)     All other components within normal limits    Narrative:     Performed at:  [address] [employer] [address]   Phone [phone] MICROSCOPIC URINALYSIS - Abnormal; Notable for the following components:    Hyaline Casts, UA 21-40 (*)     Fine Casts, UA 3-5 (*)     Bacteria, UA 1+ (*)     Crystals, UA 3+ Ca. Oxalate (*)     WBC, UA 9 (*)     Epithelial Cells, UA 22 (*)     All other components within normal limits    Narrative:     Performed at:  [facility]  [address] [employer] [employer] [address]   Phone [phone]   COMPREHENSIVE METABOLIC PANEL W/ REFLEX TO MG FOR LOW K    Narrative:     Performed at:  [facility]  [address] [facility] [employer] [address]   Phone [phone]   TROPONIN    Narrative:     Performed at:  [facility]  [address] [facility] [employer] [address]   Phone [phone]       All other labs were within normal range or not returned as of this dictation. EMERGENCY DEPARTMENT COURSE and DIFFERENTIAL DIAGNOSIS/MDM:   Vitals:    Vitals:    02/09/21 1214   BP: 128/76   Pulse: 80   Resp: 16   Temp: 97 °F (36.1 °C)   TempSrc: Temporal   SpO2: 100%   Weight: 115 lb 11.9 oz (52.5 kg)   Height: 5' 3\" (1.6 m)         Medical decision making:  [de-identified] yo F with PMHx of dementia, SSS with pacemaker placement, severe mitral regurg who is not an operative candidate per chart review, presents several hours after syncopal episode, non-toxic appearing, afebrile and HDS, at her neuro baseline per her . EKG largely stable from priors but with lengthening DC interval compared to prior. Requires pacer interrogation for eval for dysrhythmia, appears clinically volume down, giving IVFs. Eval for other potential causes. Found to have UTI, mild LA elevation to 2.1, with IVFs, given her non-toxic appearance, anticipate clearance, consulted cardiology regarding EKG with prolonging OR interval, d/[facility] Dr[facility] Ashish Lim [facility] pacer interrogation, there were no ventricular tachydysrhythmias detected and Dr. Ashish Lim in this context does not think pt's syncopal episode was 2/2 a dysrhythmia either. Likely 2/2 volume depletion, vasovagal response, UTI. After hydration, pt not significantly orthostatic, is HDS, at neuro baseline, tolerated PO,  amenable to taking the pt home and pt stable for discharge. He voices understanding of the return precautions and d/c instructions given. She is able to ambulate steadily in the ED without symptoms. D/c home with close outpt follow up with her cardiologist and PCP. Medications   lactated ringers bolus (0 mLs Intravenous Stopped 2/9/21 1337)   cefTRIAXone (ROCEPHIN) 1000 mg IVPB in 50 mL D5W minibag (0 mg Intravenous Stopped 2/9/21 1519)   lactated ringers bolus (0 mLs Intravenous Stopped 2/9/21 1519)       CONSULTS:  IP CONSULT TO CARDIOLOGY             FINAL IMPRESSION      1. Syncope and collapse          DISPOSITION/PLAN   DISPOSITION        PATIENT REFERRED TO:  Summer Winslow MD  [address] [facility].   [address] [address]    In 1 day      Justin Bowers MD  [place] [address] [phone]  [address] [phone]    In 1 day        DISCHARGE MEDICATIONS:  Discharge Medication List as of 2/9/2021  4:11 PM      START taking these medications    Details   cefUROXime (CEFTIN) 250 MG tablet Take 1 tablet by mouth 2 times daily for 7 days, Disp-14 tablet, R-0Print                (Please note that portions of this note were completed with a voice recognition program.Efforts were made to edit the dictations but occasionally words are mis-transcribed.)    Lafaye Boeck, MD (electronically signed)  Attending Emergency Physician Erin Flanagan MD  02/09/21 4941

## 2021-02-09 NOTE — ED NOTES
Discharge and education instructions reviewed. Patient verbalized understanding, teach-back successful. Patient denied questions at this time. No acute distress noted. Patient instructed to follow-up as noted - return to emergency department if symptoms worsen. Patient verbalized understanding. Discharged per EDMD with discharged instructions.        Lillian Shoemaker RN  02/09/21 9452

## 2021-03-03 ENCOUNTER — APPOINTMENT (OUTPATIENT)
Dept: GENERAL RADIOLOGY | Age: 81
End: 2021-03-03
Payer: MEDICARE

## 2021-03-03 ENCOUNTER — HOSPITAL ENCOUNTER (EMERGENCY)
Age: 81
Discharge: HOME | End: 2021-03-03
Attending: EMERGENCY MEDICINE
Payer: MEDICARE

## 2021-03-03 VITALS
HEART RATE: 65 BPM | RESPIRATION RATE: 16 BRPM | TEMPERATURE: 97.1 F | SYSTOLIC BLOOD PRESSURE: 129 MMHG | DIASTOLIC BLOOD PRESSURE: 80 MMHG | OXYGEN SATURATION: 99 %

## 2021-03-03 DIAGNOSIS — F41.1 ANXIETY STATE: Primary | ICD-10-CM

## 2021-03-03 LAB
EKG ATRIAL RATE: 65 BPM
EKG DIAGNOSIS: NORMAL
EKG P AXIS: -22 DEGREES
EKG P-R INTERVAL: 280 MS
EKG Q-T INTERVAL: 418 MS
EKG QRS DURATION: 114 MS
EKG QTC CALCULATION (BAZETT): 434 MS
EKG R AXIS: 36 DEGREES
EKG T AXIS: 47 DEGREES
EKG VENTRICULAR RATE: 65 BPM
SARS-COV-2, NAAT: NOT DETECTED

## 2021-03-03 PROCEDURE — 93010 ELECTROCARDIOGRAM REPORT: CPT | Performed by: INTERNAL MEDICINE

## 2021-03-03 PROCEDURE — 87635 SARS-COV-2 COVID-19 AMP PRB: CPT

## 2021-03-03 PROCEDURE — 71045 X-RAY EXAM CHEST 1 VIEW: CPT

## 2021-03-03 PROCEDURE — 93005 ELECTROCARDIOGRAM TRACING: CPT | Performed by: EMERGENCY MEDICINE

## 2021-03-03 PROCEDURE — 99283 EMERGENCY DEPT VISIT LOW MDM: CPT

## 2021-03-03 ASSESSMENT — ENCOUNTER SYMPTOMS
VOMITING: 0
EYE DISCHARGE: 0
SHORTNESS OF BREATH: 0
COLOR CHANGE: 0
ABDOMINAL PAIN: 0
COUGH: 1
CONSTIPATION: 0
EYE ITCHING: 0

## 2021-03-03 NOTE — ED PROVIDER NOTES
[address] [facility]      Pt Name: Sherry Alvarez  MRN: [id number]  [place] [birth year]  Date of evaluation: 3/3/2021  Provider: Octavia Lambert MD    CHIEF COMPLAINT       Anxiety    HISTORY OF PRESENT ILLNESS    Sherry Alvarez is a [de-identified] y.o. female who presents to the emergency department with panic attack. Patient got in an argument with  this morning started hyperventilating therefore brought to the emergency room. On arrival patient has no complaints. States she feels better. No suicidal or homicidal ideation. Denies shortness of breath or chest pain. States this has happened before. No other associated symptoms. Nursing Notes were reviewed. Including nursing noted for FM, Surgical History, Past Medical History, Social History, vitals, and allergies; agree with all. REVIEW OF SYSTEMS       Review of Systems   Constitutional: Negative for diaphoresis and unexpected weight change. HENT: Negative for congestion and dental problem. Eyes: Negative for discharge and itching. Respiratory: Positive for cough. Negative for shortness of breath. Cardiovascular: Negative for chest pain and leg swelling. Gastrointestinal: Negative for abdominal pain, constipation and vomiting. Endocrine: Negative for cold intolerance and heat intolerance. Genitourinary: Negative for vaginal bleeding, vaginal discharge and vaginal pain. Musculoskeletal: Negative for neck pain and neck stiffness. Skin: Negative for color change and pallor. Neurological: Negative for tremors and weakness. Psychiatric/Behavioral: Negative for agitation and behavioral problems. The patient is nervous/anxious. Except as noted above the remainder of the review of systems was reviewed and negative.      PAST MEDICAL HISTORY     Past Medical History:   Diagnosis Date    Dementia ([address]     Irregular heart beat     Mitral valve prolapse SURGICAL HISTORY       Past Surgical History:   Procedure Laterality Date    BREAST BIOPSY Left     HAND SURGERY Left 6/11/2020    CLOSED REDUCTION LEFT SMALL FINGER DISLOCATION OF METACARPOPHALANGEAL JOINT performed by Daya Mcgee MD at . [address]        CURRENT MEDICATIONS       Discharge Medication List as of 3/3/2021  6:16 AM      CONTINUE these medications which have NOT CHANGED    Details   donepezil (ARICEPT) 10 MG tablet Take 10 mg by mouth nightlyHistorical Med      sertraline (ZOLOFT) 25 MG tablet Take 25 mg by mouth dailyHistorical Med      calcium carbonate-vitamin D (CALTRATE) 600-400 MG-UNIT TABS per tab Take 1 tablet by mouth dailyHistorical Med      Cyanocobalamin (VITAMIN B-12) 5000 MCG SUBL Place 5,000 mcg under the tongue dailyHistorical Med      ASPIRIN 81 PO Take 81 mg by mouth dailyHistorical Med      latanoprost (XALATAN) 0.005 % ophthalmic solution Apply 1 drop to eye nightlyHistorical Med      brimonidine (ALPHAGAN) 0.2 % ophthalmic solution Apply 1 drop to eye 2 times dailyHistorical Med      metoprolol tartrate (LOPRESSOR) 25 MG tablet Take 25 mg by mouth 2 times daily             ALLERGIES     Patient has no known allergies. FAMILY HISTORY      No family history on file.     SOCIAL HISTORY       Social History     Socioeconomic History    Marital status:      Spouse name: None    Number of children: None    Years of education: None    Highest education level: None   Occupational History    None   Social Needs    Financial resource strain: None    Food insecurity     Worry: None     Inability: None    Transportation needs     Medical: None     Non-medical: None   Tobacco Use    Smoking status: Never Smoker    Smokeless tobacco: Never Used   Substance and Sexual Activity    Alcohol use: No    Drug use: Never    Sexual activity: Not Currently   Lifestyle    Physical activity     Days per week: None     Minutes per session: None  Stress: None   Relationships    Social connections     Talks on phone: None     Gets together: None     Attends Moravian service: None     Active member of club or organization: None     Attends meetings of clubs or organizations: None     Relationship status: None    Intimate partner violence     Fear of current or ex partner: None     Emotionally abused: None     Physically abused: None     Forced sexual activity: None   Other Topics Concern    None   Social History Narrative    None       PHYSICAL EXAM       ED Triage Vitals [03/03/21 0530]   BP Temp Temp Source Pulse Resp SpO2 Height Weight   129/80 97.1 °F (36.2 °C) Oral 65 16 99 % -- --       Physical Exam  Vitals signs and nursing note reviewed. Constitutional:       General: She is not in acute distress. Appearance: She is well-developed. She is not ill-appearing, toxic-appearing or diaphoretic. HENT:      Head: Normocephalic and atraumatic. Right Ear: External ear normal.      Left Ear: External ear normal.   Eyes:      General:         Right eye: No discharge. Left eye: No discharge. Conjunctiva/sclera: Conjunctivae normal.      Pupils: Pupils are equal, round, and reactive to light. Neck:      Musculoskeletal: Normal range of motion and neck supple. Cardiovascular:      Rate and Rhythm: Normal rate and regular rhythm. Heart sounds: No murmur. Pulmonary:      Effort: Pulmonary effort is normal. No respiratory distress. Breath sounds: Normal breath sounds. No wheezing or rales. Abdominal:      General: Bowel sounds are normal. There is no distension. Palpations: Abdomen is soft. There is no mass. Tenderness: There is no abdominal tenderness. There is no guarding or rebound. Genitourinary:     Comments: Deferred  Musculoskeletal: Normal range of motion. General: No deformity. Skin:     General: Skin is warm. Findings: No erythema or rash.    Neurological: Mental Status: She is alert and oriented to person, place, and time. She is not disoriented. Cranial Nerves: No cranial nerve deficit. Motor: No atrophy or abnormal muscle tone. Coordination: Coordination normal.   Psychiatric:         Behavior: Behavior normal.         Thought Content: Thought content normal.         DIAGNOSTIC RESULTS     EKG: All EKG's are interpreted by the Emergency Department Physician who either signs or Co-signs this chart in the absence of acardiologist.    EKG shows electronic atrial pacemaker incomplete left bundle branch block nonspecific EKG changes no ectopy similar when compared to old    RADIOLOGY:   Non-plain film images such as CT, Ultrasoundand MRI are read by the radiologist. Plain radiographic images are visualized and preliminarily interpreted by the emergency physician with the below findings:    Chest x-ray clear    ED BEDSIDE ULTRASOUND:   Performed by ED Physician - none    LABS:  Labs Reviewed   COVID-19, RAPID    Narrative:     Performed at:  [address] [address]   Phone [phone]       All other labs were withinnormal range or not returned as of this dictation. EMERGENCY DEPARTMENT COURSE and DIFFERENTIAL DIAGNOSIS/MDM:     PMH, Surgical Hx, FH, Social Hx reviewed by myself (ETOH usage, Tobacco usage, Drug usage reviewed by myself, no pertinent Hx)- No Pertinent Hx     Old records were reviewed by me     Patient feels better. She is not suicidal or homicidal.   is here to drive patient home. She has no symptoms on discharge. I estimate there is LOW risk for Sepsis, MI, Stroke, Tamponade, PTX, Toxicity or other life threatening etiology thus I consider the discharge disposition reasonable. The patient is at low risk for mortality based on demographic, history and clinical factors. Given the best available information and clinical assessment, I estimate the risk of hospitalization to be greater than risk of treatment at home. I have explained to the patient that the risk could rapidly change, given precautions for return and instructions. Explained to patient that the risk for mortality is low based on demographic, history and clinical factors. I discussed with patient the results of evaluation in the ED, diagnosis, care, and prognosis. The plan is to discharge to home. Patient is in agreement with plan and questions have been answered. I also discussed with patient the reasons which may require a return visit and the importance of follow-up care. The patient is well-appearing, nontoxic, and improved at the time of discharge. Patient agrees to call to arrange follow-up care as directed. Patient understands to return immediately for worsening/change in symptoms. CRITICAL CARE TIME   Total Critical Caretime was 21 minutes, excluding separately reportable procedures. There was a high probability of clinically significant/life threatening deterioration in the patient's condition which required my urgent intervention. PROCEDURES:  Unlessotherwise noted below, none    FINAL IMPRESSION      1. Anxiety state          DISPOSITION/PLAN   DISPOSITION Decision To Discharge 03/03/2021 06:13:19 AM    PATIENT REFERRED TO:  Tucker Kong MD  [address] [facility].   [address] [facility] [address]  [phone]            DISCHARGE MEDICATIONS:  Discharge Medication List as of 3/3/2021  6:16 AM             (Please note that portions ofthis note were completed with a voice recognition program.  Efforts were made to edit the dictations but occasionally words are mis-transcribed.)    Sylvia Santos MD(electronically signed)  Attending Emergency Physician        Sylvia Santos MD  03/03/21 4626

## 2021-03-26 ENCOUNTER — HOSPITAL ENCOUNTER (OUTPATIENT)
Dept: WOMENS IMAGING | Age: 81
Discharge: HOME | End: 2021-03-26
Payer: MEDICARE

## 2021-03-26 DIAGNOSIS — R92.8 ABNORMAL MAMMOGRAM: ICD-10-CM

## 2021-03-26 PROCEDURE — G0279 TOMOSYNTHESIS, MAMMO: HCPCS

## 2021-07-01 ENCOUNTER — APPOINTMENT (OUTPATIENT)
Dept: CT IMAGING | Age: 81
DRG: 871 | End: 2021-07-01
Payer: MEDICARE

## 2021-07-01 ENCOUNTER — APPOINTMENT (OUTPATIENT)
Dept: GENERAL RADIOLOGY | Age: 81
DRG: 871 | End: 2021-07-01
Payer: MEDICARE

## 2021-07-01 ENCOUNTER — HOSPITAL ENCOUNTER (INPATIENT)
Age: 81
LOS: 7 days | Discharge: NURSING, REHAB OR LONG-TERM CARE | DRG: 871 | End: 2021-07-08
Attending: EMERGENCY MEDICINE | Admitting: STUDENT IN AN ORGANIZED HEALTH CARE EDUCATION/TRAINING PROGRAM
Payer: MEDICARE

## 2021-07-01 DIAGNOSIS — R41.82 ALTERED MENTAL STATUS, UNSPECIFIED ALTERED MENTAL STATUS TYPE: Primary | ICD-10-CM

## 2021-07-01 LAB
A/G RATIO: 1.8 (ref 1.1–2.2)
ACETAMINOPHEN LEVEL: <5 UG/ML (ref 10–30)
ALBUMIN SERPL-MCNC: 4.2 G/DL (ref 3.4–5)
ALP BLD-CCNC: 75 U/L (ref 40–129)
ALT SERPL-CCNC: 13 U/L (ref 10–40)
AMPHETAMINE SCREEN, URINE: ABNORMAL
ANION GAP SERPL CALCULATED.3IONS-SCNC: 10 MMOL/L (ref 3–16)
APTT: 38.4 SEC (ref 26.2–38.6)
AST SERPL-CCNC: 20 U/L (ref 15–37)
BARBITURATE SCREEN URINE: ABNORMAL
BASE EXCESS VENOUS: 2.4 MMOL/L
BASOPHILS ABSOLUTE: 0 K/UL (ref 0–0.2)
BASOPHILS RELATIVE PERCENT: 0.4 %
BENZODIAZEPINE SCREEN, URINE: POSITIVE
BILIRUB SERPL-MCNC: 0.5 MG/DL (ref 0–1)
BILIRUBIN URINE: ABNORMAL
BLOOD, URINE: NEGATIVE
BUN BLDV-MCNC: 25 MG/DL (ref 7–20)
CALCIUM SERPL-MCNC: 9.2 MG/DL (ref 8.3–10.6)
CANNABINOID SCREEN URINE: ABNORMAL
CARBOXYHEMOGLOBIN: 1.1 %
CHLORIDE BLD-SCNC: 103 MMOL/L (ref 99–110)
CLARITY: CLEAR
CO2: 24 MMOL/L (ref 21–32)
COCAINE METABOLITE SCREEN URINE: ABNORMAL
COLOR: ABNORMAL
CREAT SERPL-MCNC: 0.9 MG/DL (ref 0.6–1.2)
EOSINOPHILS ABSOLUTE: 0 K/UL (ref 0–0.6)
EOSINOPHILS RELATIVE PERCENT: 0.5 %
EPITHELIAL CELLS, UA: 4 /HPF (ref 0–5)
ETHANOL: NORMAL MG/DL (ref 0–0.08)
GFR AFRICAN AMERICAN: >60
GFR NON-AFRICAN AMERICAN: >60
GLOBULIN: 2.4 G/DL
GLUCOSE BLD-MCNC: 143 MG/DL (ref 70–99)
GLUCOSE BLD-MCNC: 149 MG/DL (ref 70–99)
GLUCOSE URINE: NEGATIVE MG/DL
HCO3 VENOUS: 27 MMOL/L (ref 23–29)
HCT VFR BLD CALC: 38.9 % (ref 36–48)
HEMOGLOBIN: 13.5 G/DL (ref 12–16)
HYALINE CASTS: 7 /LPF (ref 0–8)
INR BLD: 2.01 (ref 0.88–1.12)
KETONES, URINE: 15 MG/DL
LACTIC ACID: 1.2 MMOL/L (ref 0.4–2)
LEUKOCYTE ESTERASE, URINE: NEGATIVE
LIPASE: 25 U/L (ref 13–60)
LYMPHOCYTES ABSOLUTE: 0.3 K/UL (ref 1–5.1)
LYMPHOCYTES RELATIVE PERCENT: 5.7 %
Lab: ABNORMAL
MCH RBC QN AUTO: 33 PG (ref 26–34)
MCHC RBC AUTO-ENTMCNC: 34.6 G/DL (ref 31–36)
MCV RBC AUTO: 95.5 FL (ref 80–100)
METHADONE SCREEN, URINE: ABNORMAL
METHEMOGLOBIN VENOUS: 0.5 %
MICROSCOPIC EXAMINATION: YES
MONOCYTES ABSOLUTE: 0.3 K/UL (ref 0–1.3)
MONOCYTES RELATIVE PERCENT: 5.8 %
NEUTROPHILS ABSOLUTE: 4.1 K/UL (ref 1.7–7.7)
NEUTROPHILS RELATIVE PERCENT: 87.6 %
NITRITE, URINE: NEGATIVE
O2 CONTENT, VEN: ABNORMAL ML/DL
O2 SAT, VEN: 53 %
O2 THERAPY: ABNORMAL
OPIATE SCREEN URINE: ABNORMAL
OXYCODONE URINE: ABNORMAL
PCO2, VEN: 40.6 MMHG (ref 40–50)
PDW BLD-RTO: 13.3 % (ref 12.4–15.4)
PERFORMED ON: ABNORMAL
PH UA: 5.5 (ref 5–8)
PH UA: 6
PH VENOUS: 7.43 (ref 7.35–7.45)
PHENCYCLIDINE SCREEN URINE: ABNORMAL
PLATELET # BLD: 156 K/UL (ref 135–450)
PMV BLD AUTO: 9.8 FL (ref 5–10.5)
PO2, VEN: <30 MMHG
POTASSIUM SERPL-SCNC: 3.3 MMOL/L (ref 3.5–5.1)
PROPOXYPHENE SCREEN: ABNORMAL
PROTEIN UA: ABNORMAL MG/DL
PROTHROMBIN TIME: 23.4 SEC (ref 9.9–12.7)
RBC # BLD: 4.08 M/UL (ref 4–5.2)
RBC UA: 7 /HPF (ref 0–4)
SALICYLATE, SERUM: <0.3 MG/DL (ref 15–30)
SARS-COV-2, NAAT: NOT DETECTED
SODIUM BLD-SCNC: 137 MMOL/L (ref 136–145)
SPECIFIC GRAVITY UA: 1.03 (ref 1–1.03)
TCO2 CALC VENOUS: 28 MMOL/L
TOTAL PROTEIN: 6.6 G/DL (ref 6.4–8.2)
TROPONIN: <0.01 NG/ML
TSH REFLEX: 1.31 UIU/ML (ref 0.27–4.2)
URINE REFLEX TO CULTURE: ABNORMAL
URINE TYPE: ABNORMAL
UROBILINOGEN, URINE: 1 E.U./DL
WBC # BLD: 4.7 K/UL (ref 4–11)
WBC UA: 1 /HPF (ref 0–5)

## 2021-07-01 PROCEDURE — 70450 CT HEAD/BRAIN W/O DYE: CPT

## 2021-07-01 PROCEDURE — 85610 PROTHROMBIN TIME: CPT

## 2021-07-01 PROCEDURE — 87040 BLOOD CULTURE FOR BACTERIA: CPT

## 2021-07-01 PROCEDURE — 6360000002 HC RX W HCPCS: Performed by: EMERGENCY MEDICINE

## 2021-07-01 PROCEDURE — 1200000000 HC SEMI PRIVATE

## 2021-07-01 PROCEDURE — 6370000000 HC RX 637 (ALT 250 FOR IP): Performed by: EMERGENCY MEDICINE

## 2021-07-01 PROCEDURE — 84443 ASSAY THYROID STIM HORMONE: CPT

## 2021-07-01 PROCEDURE — 87635 SARS-COV-2 COVID-19 AMP PRB: CPT

## 2021-07-01 PROCEDURE — 87186 SC STD MICRODIL/AGAR DIL: CPT

## 2021-07-01 PROCEDURE — 93005 ELECTROCARDIOGRAM TRACING: CPT | Performed by: EMERGENCY MEDICINE

## 2021-07-01 PROCEDURE — 82803 BLOOD GASES ANY COMBINATION: CPT

## 2021-07-01 PROCEDURE — 85025 COMPLETE CBC W/AUTO DIFF WBC: CPT

## 2021-07-01 PROCEDURE — 82077 ASSAY SPEC XCP UR&BREATH IA: CPT

## 2021-07-01 PROCEDURE — 99283 EMERGENCY DEPT VISIT LOW MDM: CPT

## 2021-07-01 PROCEDURE — 83605 ASSAY OF LACTIC ACID: CPT

## 2021-07-01 PROCEDURE — 80307 DRUG TEST PRSMV CHEM ANLYZR: CPT

## 2021-07-01 PROCEDURE — 87150 DNA/RNA AMPLIFIED PROBE: CPT

## 2021-07-01 PROCEDURE — P9612 CATHETERIZE FOR URINE SPEC: HCPCS

## 2021-07-01 PROCEDURE — 71045 X-RAY EXAM CHEST 1 VIEW: CPT

## 2021-07-01 PROCEDURE — 84484 ASSAY OF TROPONIN QUANT: CPT

## 2021-07-01 PROCEDURE — 85730 THROMBOPLASTIN TIME PARTIAL: CPT

## 2021-07-01 PROCEDURE — 81001 URINALYSIS AUTO W/SCOPE: CPT

## 2021-07-01 PROCEDURE — 83690 ASSAY OF LIPASE: CPT

## 2021-07-01 PROCEDURE — 2580000003 HC RX 258: Performed by: EMERGENCY MEDICINE

## 2021-07-01 PROCEDURE — 80143 DRUG ASSAY ACETAMINOPHEN: CPT

## 2021-07-01 PROCEDURE — 36415 COLL VENOUS BLD VENIPUNCTURE: CPT

## 2021-07-01 PROCEDURE — 80053 COMPREHEN METABOLIC PANEL: CPT

## 2021-07-01 PROCEDURE — 80179 DRUG ASSAY SALICYLATE: CPT

## 2021-07-01 PROCEDURE — 80151 DRUG ASSAY AMIODARONE: CPT

## 2021-07-01 RX ORDER — ALPRAZOLAM 0.25 MG/1
0.25 TABLET ORAL NIGHTLY PRN
Status: DISCONTINUED | OUTPATIENT
Start: 2021-07-02 | End: 2021-07-08 | Stop reason: HOSPADM

## 2021-07-01 RX ORDER — ALPRAZOLAM 0.25 MG/1
0.25 TABLET ORAL NIGHTLY PRN
Status: DISCONTINUED | OUTPATIENT
Start: 2021-07-02 | End: 2021-07-01

## 2021-07-01 RX ORDER — BRIMONIDINE TARTRATE 2 MG/ML
1 SOLUTION/ DROPS OPHTHALMIC 2 TIMES DAILY
Status: DISCONTINUED | OUTPATIENT
Start: 2021-07-01 | End: 2021-07-01

## 2021-07-01 RX ORDER — 0.9 % SODIUM CHLORIDE 0.9 %
1000 INTRAVENOUS SOLUTION INTRAVENOUS ONCE
Status: COMPLETED | OUTPATIENT
Start: 2021-07-01 | End: 2021-07-01

## 2021-07-01 RX ORDER — ACETAMINOPHEN 650 MG/1
650 SUPPOSITORY RECTAL ONCE
Status: COMPLETED | OUTPATIENT
Start: 2021-07-01 | End: 2021-07-01

## 2021-07-01 RX ORDER — DONEPEZIL HYDROCHLORIDE 10 MG/1
10 TABLET, FILM COATED ORAL NIGHTLY
Status: DISCONTINUED | OUTPATIENT
Start: 2021-07-01 | End: 2021-07-08 | Stop reason: HOSPADM

## 2021-07-01 RX ORDER — OLANZAPINE 5 MG/1
5 TABLET ORAL EVERY MORNING
COMMUNITY

## 2021-07-01 RX ORDER — METOPROLOL SUCCINATE 25 MG/1
25 TABLET, EXTENDED RELEASE ORAL 2 TIMES DAILY
Status: DISCONTINUED | OUTPATIENT
Start: 2021-07-01 | End: 2021-07-08 | Stop reason: HOSPADM

## 2021-07-01 RX ORDER — ALPRAZOLAM 0.25 MG/1
0.25 TABLET ORAL NIGHTLY PRN
Status: DISCONTINUED | OUTPATIENT
Start: 2021-07-01 | End: 2021-07-01

## 2021-07-01 RX ORDER — LATANOPROST 50 UG/ML
1 SOLUTION/ DROPS OPHTHALMIC NIGHTLY
Status: DISCONTINUED | OUTPATIENT
Start: 2021-07-01 | End: 2021-07-08 | Stop reason: HOSPADM

## 2021-07-01 RX ORDER — ASPIRIN 81 MG/1
81 TABLET, CHEWABLE ORAL DAILY
COMMUNITY
End: 2021-07-01

## 2021-07-01 RX ORDER — ALPRAZOLAM 0.25 MG/1
0.25 TABLET ORAL ONCE
Status: COMPLETED | OUTPATIENT
Start: 2021-07-01 | End: 2021-07-01

## 2021-07-01 RX ORDER — ASPIRIN 81 MG/1
81 TABLET, CHEWABLE ORAL DAILY
Status: DISCONTINUED | OUTPATIENT
Start: 2021-07-02 | End: 2021-07-01

## 2021-07-01 RX ORDER — METOPROLOL SUCCINATE 25 MG/1
25 TABLET, EXTENDED RELEASE ORAL 2 TIMES DAILY
COMMUNITY

## 2021-07-01 RX ORDER — AMIODARONE HYDROCHLORIDE 200 MG/1
200 TABLET ORAL DAILY
Status: DISCONTINUED | OUTPATIENT
Start: 2021-07-02 | End: 2021-07-08 | Stop reason: HOSPADM

## 2021-07-01 RX ORDER — AMIODARONE HYDROCHLORIDE 200 MG/1
200 TABLET ORAL DAILY
COMMUNITY

## 2021-07-01 RX ADMIN — ALPRAZOLAM 0.25 MG: 0.25 TABLET ORAL at 22:28

## 2021-07-01 RX ADMIN — ACETAMINOPHEN 650 MG: 650 SUPPOSITORY RECTAL at 19:21

## 2021-07-01 RX ADMIN — SODIUM CHLORIDE 1000 ML: 9 INJECTION, SOLUTION INTRAVENOUS at 19:31

## 2021-07-01 RX ADMIN — CEFTRIAXONE 1000 MG: 1 INJECTION, POWDER, FOR SOLUTION INTRAMUSCULAR; INTRAVENOUS at 22:17

## 2021-07-01 ASSESSMENT — PAIN SCALES - GENERAL: PAINLEVEL_OUTOF10: 0

## 2021-07-01 NOTE — ED PROVIDER NOTES
Triage Chief Complaint:   Altered Mental Status (been really sleepy for the past couple days. just not acting right per son )    [place] [facility] [place]:  Killian Castillo is a 80 y.o. female with past medical history including but not limited to dementia, hypertension who presents with son for increasing weakness and altered mental status. The patient's son states on Friday she was last normal but started to appear somewhat weak, she then went to a doctor's appointment on Tuesday and was able to ambulate to the appointment without issue however over the last 24-48 hours (it is now Thursday) she is deteriorated to the point where she cannot get up and ambulate on her own, she is constantly shaking and she is very fatigued even beyond what will be normal for her. The patient arrives alert and awake able to communicate but cannot reliably do HPI/ROS. She is febrile on arrival    ROS:  Cannot assess    Past Medical History:   Diagnosis Date    Dementia ([facility] [address]     Irregular heart beat     Mitral valve prolapse      Past Surgical History:   Procedure Laterality Date    BREAST BIOPSY Left     HAND SURGERY Left 6/11/2020    CLOSED REDUCTION LEFT SMALL FINGER DISLOCATION OF METACARPOPHALANGEAL JOINT performed [facility]      No family history on file.   Social History     Socioeconomic History    Marital status:      Spouse name: Not on file    Number of children: Not on file    Years of education: Not on file    Highest education level: Not on file   Occupational History    Not on file   Tobacco Use    Smoking status: Never Smoker    Smokeless tobacco: Never Used   Vaping Use    Vaping Use: Never used   Substance and Sexual Activity    Alcohol use: No    Drug use: Never    Sexual activity: Not Currently   Other Topics Concern    Not on file   Social History Narrative    Not on file     Social Determinants of Health     Financial Resource Strain:     Difficulty of Paying Living Expenses:    Food Insecurity:     Worried About [address] in the Last Year:     [address] [facility] [address] in the Last Year:    Transportation Needs:     Lack of Transportation (Medical):  Lack of Transportation (Non-Medical):    Physical Activity:     Days of Exercise per Week:     Minutes of Exercise per Session:    Stress:     Feeling of Stress :    Social Connections:     Frequency of Communication with Friends and Family:     Frequency of Social Gatherings with Friends and Family:     Attends Synagogue Services:     Active Member of Clubs or Organizations:     Attends Club or Organization Meetings:     Marital Status:    Intimate Partner Violence:     Fear of Current or Ex-Partner:     Emotionally Abused:     Physically Abused:     Sexually Abused:      Current Facility-Administered Medications   Medication Dose Route Frequency Provider Last Rate Last Admin    cefTRIAXone (ROCEPHIN) 1000 mg IVPB in 50 mL D5W minibag  1,000 mg Intravenous Once Александр Beard MD         Current Outpatient Medications   Medication Sig Dispense Refill    aspirin 81 MG chewable tablet Take 81 mg by mouth daily      donepezil (ARICEPT) 10 MG tablet Take 10 mg by mouth nightly      sertraline (ZOLOFT) 25 MG tablet Take 25 mg by mouth daily      calcium carbonate-vitamin D (CALTRATE) 600-400 MG-UNIT TABS per tab Take 1 tablet by mouth daily      Cyanocobalamin (VITAMIN B-12) 5000 MCG SUBL Place 5,000 mcg under the tongue daily      latanoprost (XALATAN) 0.005 % ophthalmic solution Apply 1 drop to eye nightly      brimonidine (ALPHAGAN) 0.2 % ophthalmic solution Apply 1 drop to eye 2 times daily      metoprolol tartrate (LOPRESSOR) 25 MG tablet Take 25 mg by mouth 2 times daily       No Known Allergies    [unfilled]    Nursing Notes Reviewed    Physical Exam:  Vitals:    07/01/21 2052   BP: (!) 141/75   Pulse: 85   Resp: 18   Temp:    SpO2: 96%       GENERAL APPEARANCE: Awake and alert. Cooperative. Tremoring and appears confused  HEAD: Normocephalic. Atraumatic. EYES: EOM's grossly intact. Sclera anicteric. ENT: Mucous membranes are moist. Tolerates saliva. No trismus. NECK: Supple. No meningismus. Trachea midline. HEART: RRR. Radial pulses 2+. LUNGS: Respirations unlabored. CTAB  ABDOMEN: Soft. Non-tender. No guarding or rebound. EXTREMITIES: No acute deformities. SKIN: Warm and dry. NEUROLOGICAL: No gross facial drooping. Moves all 4 extremities spontaneously. PSYCHIATRIC: Confused.     I have reviewed and interpreted all of the currently available lab results from this visit (if applicable):  Results for orders placed or performed during the hospital encounter of 07/01/21   TSH with Reflex   Result Value Ref Range    TSH 1.31 0.27 - 4.20 uIU/mL   CBC Auto Differential   Result Value Ref Range    WBC 4.7 4.0 - 11.0 K/uL    RBC 4.08 4.00 - 5.20 M/uL    Hemoglobin 13.5 12.0 - 16.0 g/dL    Hematocrit 38.9 36.0 - 48.0 %    MCV 95.5 80.0 - 100.0 fL    MCH 33.0 26.0 - 34.0 pg    MCHC 34.6 31.0 - 36.0 g/dL    RDW 13.3 12.4 - 15.4 %    Platelets 022 217 - 683 K/uL    MPV 9.8 5.0 - 10.5 fL    Neutrophils % 87.6 %    Lymphocytes % 5.7 %    Monocytes % 5.8 %    Eosinophils % 0.5 %    Basophils % 0.4 %    Neutrophils Absolute 4.1 1.7 - 7.7 K/uL    Lymphocytes Absolute 0.3 (L) 1.0 - 5.1 K/uL    Monocytes Absolute 0.3 0.0 - 1.3 K/uL    Eosinophils Absolute 0.0 0.0 - 0.6 K/uL    Basophils Absolute 0.0 0.0 - 0.2 K/uL   Comprehensive Metabolic Panel   Result Value Ref Range    Sodium 137 136 - 145 mmol/L    Potassium 3.3 (L) 3.5 - 5.1 mmol/L    Chloride 103 99 - 110 mmol/L    CO2 24 21 - 32 mmol/L    Anion Gap 10 3 - 16    Glucose 143 (H) 70 - 99 mg/dL    BUN 25 (H) 7 - 20 mg/dL    CREATININE 0.9 0.6 - 1.2 mg/dL    GFR Non-African American >60 >60    GFR African American >60 >60    Calcium 9.2 8.3 - 10.6 mg/dL    Total Protein 6.6 6.4 - 8.2 g/dL    Albumin 4.2 3.4 - 5.0 g/dL    Albumin/Globulin Ratio 1.8 1.1 - 2.2    Total Bilirubin 0.5 0.0 - 1.0 mg/dL    Alkaline Phosphatase 75 40 - 129 U/L    ALT 13 10 - 40 U/L    AST 20 15 - 37 U/L    Globulin 2.4 g/dL   Lipase   Result Value Ref Range    Lipase 25.0 13.0 - 60.0 U/L   Lactic Acid, Plasma   Result Value Ref Range    Lactic Acid 1.2 0.4 - 2.0 mmol/L   Troponin   Result Value Ref Range    Troponin <0.01 <0.01 ng/mL   Protime-INR   Result Value Ref Range    Protime 23.4 (H) 9.9 - 12.7 sec    INR 2.01 (H) 0.88 - 1.12   APTT   Result Value Ref Range    aPTT 38.4 26.2 - 38.6 sec   Acetaminophen Level   Result Value Ref Range    Acetaminophen Level <5 (L) 10 - 30 ug/mL   Salicylate   Result Value Ref Range    Salicylate, Serum <6.5 (L) 15.0 - 30.0 mg/dL   Ethanol   Result Value Ref Range    Ethanol Lvl None Detected mg/dL   Urine Drug Screen   Result Value Ref Range    Amphetamine Screen, Urine Neg Negative <1000ng/mL    Barbiturate Screen, Ur Neg Negative <200 ng/mL    Benzodiazepine Screen, Urine POSITIVE (A) Negative <200 ng/mL    Cannabinoid Scrn, Ur Neg Negative <50 ng/mL    Cocaine Metabolite Screen, Urine Neg Negative <300 ng/mL    Opiate Scrn, Ur Neg Negative <300 ng/mL    PCP Screen, Urine Neg Negative <25 ng/mL    Methadone Screen, Urine Neg Negative <300 ng/mL    Propoxyphene Scrn, Ur Neg Negative <300 ng/mL    Oxycodone Urine Neg Negative <100 ng/ml    pH, UA 6.0     Drug Screen Comment: see below    Urine, reflex to culture    Specimen: Urine, straight catheter   Result Value Ref Range    Color, UA DK YELLOW Straw/Yellow    Clarity, UA Clear Clear    Glucose, Ur Negative Negative mg/dL    Bilirubin Urine SMALL (A) Negative    Ketones, Urine 15 (A) Negative mg/dL    Specific Gravity, UA 1.029 1.005 - 1.030    Blood, Urine Negative Negative    pH, UA 5.5 5.0 - 8.0    Protein, UA TRACE (A) Negative mg/dL    Urobilinogen, Urine 1.0 <2.0 E.U./dL    Nitrite, Urine Negative Negative    Leukocyte Esterase, Urine Negative Negative    Microscopic Examination YES Urine Type NotGiven     Urine Reflex to Culture Not Indicated    Blood Gas, Venous   Result Value Ref Range    pH, Eduard 7.430 7.350 - 7.450    pCO2, Eduard 40.6 40.0 - 50.0 mmHg    pO2, Eduard <30 Not Established mmHg    HCO3, Venous 27 23 - 29 mmol/L    Base Excess, Eduard 2.4 Not Established mmol/L    O2 Sat, Eduard 53 Not Established %    Carboxyhemoglobin 1.1 %    MetHgb, Eduard 0.5 <1.5 %    TC02 (Calc), Eduard 28 Not Established mmol/L    O2 Content, Eduard NA (AA) Not Established mL/dL    O2 Therapy Unknown    Microscopic Urinalysis   Result Value Ref Range    Hyaline Casts, UA 7 0 - 8 /LPF    WBC, UA 1 0 - 5 /HPF    RBC, UA 7 (H) 0 - 4 /HPF    Epithelial Cells, UA 4 0 - 5 /HPF   POCT Glucose   Result Value Ref Range    POC Glucose 149 (H) 70 - 99 mg/dl    Performed on ACCU-CHEK         Radiographs (if obtained):  [] The following radiograph was interpreted by myself in the absence of a radiologist:  [x] Radiologist's Report Reviewed:     CT HEAD WO CONTRAST (Final result)  Result time 07/01/21 20:06:41  Final result by Carlton Garcia MD (07/01/21 20:06:41)                Impression:    No acute intracranial abnormality. Diffuse atrophic changes with findings suggesting chronic microvascular   ischemia             Narrative:    EXAMINATION:   CT OF THE HEAD WITHOUT CONTRAST  7/1/2021 7:00 pm     TECHNIQUE:   CT of the head was performed without the administration of intravenous   contrast. Dose modulation, iterative reconstruction, and/or weight based   adjustment of the mA/kV was utilized to reduce the radiation dose to as low   as reasonably achievable. COMPARISON:   06/10/2020     HISTORY:   ORDERING SYSTEM PROVIDED HISTORY: AMS   TECHNOLOGIST PROVIDED HISTORY:   Reason for exam:->AMS   Has a \"code stroke\" or \"stroke alert\" been called? ->No   Decision Support Exception - unselect if not a suspected or confirmed   emergency medical condition->Emergency Medical Condition (MA)   Reason for Exam: AMS, fatigue FINDINGS:   BRAIN/VENTRICLES: The ventricles and sulci are diffusely enlarged.  Low   attenuation is seen in the periventricular and subcortical white matter.  No   acute intracranial hemorrhage or acute infarct is identified. ORBITS: The visualized portion of the orbits demonstrate no acute abnormality. SINUSES: The visualized paranasal sinuses and mastoid air cells demonstrate   no acute abnormality. SOFT TISSUES/SKULL:  No acute abnormality of the visualized skull or soft   tissues.                     XR CHEST PORTABLE (Final result)  Result time 07/01/21 19:38:50  Procedure changed from XR CHEST 1 VIEW  Final result by Kenton Valdes DO (07/01/21 19:38:50)                Impression:    Nonspecific subsegmental left basilar opacity. Narrative:    EXAMINATION:   ONE XRAY VIEW OF THE CHEST     7/1/2021 6:54 pm     COMPARISON:   Chest x-ray 03/20/2021. HISTORY:   ORDERING SYSTEM PROVIDED HISTORY: weakness   TECHNOLOGIST PROVIDED HISTORY:   Reason for exam:->weakness   Reason for Exam: AMS, weakness   Acuity: Acute   Type of Exam: Initial     FINDINGS:   Prominent sized heart shadow with suboptimal evaluation due to rotated   projection.  Mediastinal contours are otherwise poorly evaluated due to the   extent of rotation.  Subsegmental left basilar opacity.  No pleural effusion   on this projection.  No pneumothorax appreciated on this projection. Transvenous pacer. EKG (if obtained): (All EKG's are interpreted by myself in the absence of a cardiologist)  Initial EKG on my interpretation shows normal sinus rhythm with a rate of 68 bpm with first-degree AV block and no ST segment elevation    MDM:  Differential diagnosis: Encephalopathy, sepsis, electrolyte abnormality, amiodarone overdose, ICH    The patient arrives febrile. Cultures and IV fluid along with Tylenol given. Amiodarone level sent out    CT of the head and chest x-ray shows no acute abnormality. Cultures and labs also unremarkable as the patient is febrile we will empirically treat with Rocephin as I am concerned there may be an occult infection. Covid testing ordered    Patient admitted    Old records reviewed. Labs and imaging reviewed and results discussed with patient. .        Patient was given scripts for the following medications. I counseled patient how to take these medications. New Prescriptions    No medications on file         CRITICAL CARE TIME   Total Critical Care time was 0 minutes, excluding separately reportable procedures. There was a high probability of clinically significant/life threatening deterioration in the patient's condition which required my urgent intervention.       Clinical Impression:  Altered mental status, fever  (Please note that portions of this note may have been completed with a voice recognition program. Efforts were made to edit the dictations but occasionally words are mis-transcribed.)    MD Ton Subramanian MD  07/01/21 4921       Ton Mari MD  07/12/21 7830

## 2021-07-01 NOTE — ED NOTES
Straight cath performed per sterile protocol. Urine specimen obtained and sent to lab. Call light within reach. Will continue to monitor patient.         Titi Rogers RN  07/01/21 6993

## 2021-07-01 NOTE — ED TRIAGE NOTES
Pt arrived to ED for a complaint of altered mental status. Per , the pt was not acting herself yesterday and became more fatigued. Pt is altered at baseline due to dementia but can usually answer questions appropriately. Pt has tremors at baseline per . Pt is usually ambulatory but has been weaker the last day. VS noted and stable. Patient A&Ox1. Respirations easy and unlabored. Skin warm and dry and appropriate for ethnicity. No acute distress noted at this time. Patient placed on continuous telemetry and pulse ox upon arrival to room.

## 2021-07-02 PROBLEM — R78.81 ENTEROCOCCAL BACTEREMIA: Status: ACTIVE | Noted: 2021-07-02

## 2021-07-02 PROBLEM — G30.1 LATE ONSET ALZHEIMER'S DEMENTIA WITHOUT BEHAVIORAL DISTURBANCE (HCC): Status: ACTIVE | Noted: 2021-07-02

## 2021-07-02 PROBLEM — F02.80 LATE ONSET ALZHEIMER'S DEMENTIA WITHOUT BEHAVIORAL DISTURBANCE (HCC): Status: ACTIVE | Noted: 2021-07-02

## 2021-07-02 PROBLEM — Z95.0 STATUS CARDIAC PACEMAKER: Status: ACTIVE | Noted: 2021-07-02

## 2021-07-02 PROBLEM — B95.2 ENTEROCOCCAL BACTEREMIA: Status: ACTIVE | Noted: 2021-07-02

## 2021-07-02 LAB
EKG ATRIAL RATE: 68 BPM
EKG DIAGNOSIS: NORMAL
EKG P AXIS: 54 DEGREES
EKG P-R INTERVAL: 212 MS
EKG Q-T INTERVAL: 436 MS
EKG QRS DURATION: 128 MS
EKG QTC CALCULATION (BAZETT): 463 MS
EKG R AXIS: 63 DEGREES
EKG T AXIS: 72 DEGREES
EKG VENTRICULAR RATE: 68 BPM
LACTIC ACID: 0.8 MMOL/L (ref 0.4–2)
LV EF: 50 %
LVEF MODALITY: NORMAL
PROCALCITONIN: 0.29 NG/ML (ref 0–0.15)
REPORT: NORMAL

## 2021-07-02 PROCEDURE — 99222 1ST HOSP IP/OBS MODERATE 55: CPT | Performed by: INTERNAL MEDICINE

## 2021-07-02 PROCEDURE — 1200000000 HC SEMI PRIVATE

## 2021-07-02 PROCEDURE — 2580000003 HC RX 258: Performed by: NURSE PRACTITIONER

## 2021-07-02 PROCEDURE — 84145 PROCALCITONIN (PCT): CPT

## 2021-07-02 PROCEDURE — 97535 SELF CARE MNGMENT TRAINING: CPT

## 2021-07-02 PROCEDURE — 93010 ELECTROCARDIOGRAM REPORT: CPT | Performed by: INTERNAL MEDICINE

## 2021-07-02 PROCEDURE — 93306 TTE W/DOPPLER COMPLETE: CPT

## 2021-07-02 PROCEDURE — 36415 COLL VENOUS BLD VENIPUNCTURE: CPT

## 2021-07-02 PROCEDURE — 6370000000 HC RX 637 (ALT 250 FOR IP): Performed by: NURSE PRACTITIONER

## 2021-07-02 PROCEDURE — 6360000002 HC RX W HCPCS: Performed by: HOSPITALIST

## 2021-07-02 PROCEDURE — 97162 PT EVAL MOD COMPLEX 30 MIN: CPT

## 2021-07-02 PROCEDURE — 83605 ASSAY OF LACTIC ACID: CPT

## 2021-07-02 PROCEDURE — 2580000003 HC RX 258: Performed by: HOSPITALIST

## 2021-07-02 PROCEDURE — 97530 THERAPEUTIC ACTIVITIES: CPT

## 2021-07-02 PROCEDURE — 97166 OT EVAL MOD COMPLEX 45 MIN: CPT

## 2021-07-02 RX ORDER — ACETAMINOPHEN 325 MG/1
650 TABLET ORAL EVERY 6 HOURS PRN
Status: DISCONTINUED | OUTPATIENT
Start: 2021-07-02 | End: 2021-07-08 | Stop reason: HOSPADM

## 2021-07-02 RX ORDER — ACETAMINOPHEN 650 MG/1
650 SUPPOSITORY RECTAL EVERY 6 HOURS PRN
Status: DISCONTINUED | OUTPATIENT
Start: 2021-07-02 | End: 2021-07-08 | Stop reason: HOSPADM

## 2021-07-02 RX ORDER — SODIUM CHLORIDE 0.9 % (FLUSH) 0.9 %
5-40 SYRINGE (ML) INJECTION PRN
Status: DISCONTINUED | OUTPATIENT
Start: 2021-07-02 | End: 2021-07-08 | Stop reason: HOSPADM

## 2021-07-02 RX ORDER — SODIUM CHLORIDE 0.9 % (FLUSH) 0.9 %
5-40 SYRINGE (ML) INJECTION EVERY 12 HOURS SCHEDULED
Status: DISCONTINUED | OUTPATIENT
Start: 2021-07-02 | End: 2021-07-08 | Stop reason: HOSPADM

## 2021-07-02 RX ORDER — POLYETHYLENE GLYCOL 3350 17 G/17G
17 POWDER, FOR SOLUTION ORAL DAILY PRN
Status: DISCONTINUED | OUTPATIENT
Start: 2021-07-02 | End: 2021-07-08 | Stop reason: HOSPADM

## 2021-07-02 RX ORDER — ONDANSETRON 2 MG/ML
4 INJECTION INTRAMUSCULAR; INTRAVENOUS EVERY 6 HOURS PRN
Status: DISCONTINUED | OUTPATIENT
Start: 2021-07-02 | End: 2021-07-08 | Stop reason: HOSPADM

## 2021-07-02 RX ORDER — POTASSIUM CHLORIDE 20 MEQ/1
20 TABLET, EXTENDED RELEASE ORAL 2 TIMES DAILY WITH MEALS
Status: DISCONTINUED | OUTPATIENT
Start: 2021-07-02 | End: 2021-07-06

## 2021-07-02 RX ORDER — ONDANSETRON 4 MG/1
4 TABLET, ORALLY DISINTEGRATING ORAL EVERY 8 HOURS PRN
Status: DISCONTINUED | OUTPATIENT
Start: 2021-07-02 | End: 2021-07-08 | Stop reason: HOSPADM

## 2021-07-02 RX ORDER — SODIUM CHLORIDE 9 MG/ML
25 INJECTION, SOLUTION INTRAVENOUS PRN
Status: DISCONTINUED | OUTPATIENT
Start: 2021-07-02 | End: 2021-07-08 | Stop reason: HOSPADM

## 2021-07-02 RX ADMIN — AMPICILLIN SODIUM 2000 MG: 2 INJECTION, POWDER, FOR SOLUTION INTRAMUSCULAR; INTRAVENOUS at 16:26

## 2021-07-02 RX ADMIN — METOPROLOL SUCCINATE 25 MG: 25 TABLET, FILM COATED, EXTENDED RELEASE ORAL at 00:34

## 2021-07-02 RX ADMIN — POTASSIUM CHLORIDE 20 MEQ: 20 TABLET, EXTENDED RELEASE ORAL at 16:26

## 2021-07-02 RX ADMIN — Medication 10 ML: at 22:26

## 2021-07-02 RX ADMIN — LATANOPROST 1 DROP: 50 SOLUTION OPHTHALMIC at 22:27

## 2021-07-02 RX ADMIN — AMPICILLIN SODIUM 2000 MG: 2 INJECTION, POWDER, FOR SOLUTION INTRAMUSCULAR; INTRAVENOUS at 10:43

## 2021-07-02 RX ADMIN — POTASSIUM CHLORIDE 20 MEQ: 20 TABLET, EXTENDED RELEASE ORAL at 09:32

## 2021-07-02 RX ADMIN — AMIODARONE HYDROCHLORIDE 200 MG: 200 TABLET ORAL at 09:31

## 2021-07-02 RX ADMIN — CEFTRIAXONE 2000 MG: 2 INJECTION, POWDER, FOR SOLUTION INTRAMUSCULAR; INTRAVENOUS at 11:46

## 2021-07-02 RX ADMIN — METOPROLOL SUCCINATE 25 MG: 25 TABLET, FILM COATED, EXTENDED RELEASE ORAL at 22:26

## 2021-07-02 RX ADMIN — ALPRAZOLAM 0.25 MG: 0.25 TABLET ORAL at 22:26

## 2021-07-02 RX ADMIN — APIXABAN 5 MG: 5 TABLET, FILM COATED ORAL at 00:34

## 2021-07-02 RX ADMIN — APIXABAN 5 MG: 5 TABLET, FILM COATED ORAL at 22:26

## 2021-07-02 RX ADMIN — METOPROLOL SUCCINATE 25 MG: 25 TABLET, FILM COATED, EXTENDED RELEASE ORAL at 09:31

## 2021-07-02 RX ADMIN — Medication 10 ML: at 09:32

## 2021-07-02 RX ADMIN — APIXABAN 5 MG: 5 TABLET, FILM COATED ORAL at 09:31

## 2021-07-02 RX ADMIN — AMPICILLIN SODIUM 2000 MG: 2 INJECTION, POWDER, FOR SOLUTION INTRAMUSCULAR; INTRAVENOUS at 22:27

## 2021-07-02 RX ADMIN — CEFTRIAXONE 2000 MG: 2 INJECTION, POWDER, FOR SOLUTION INTRAMUSCULAR; INTRAVENOUS at 23:29

## 2021-07-02 RX ADMIN — LATANOPROST 1 DROP: 50 SOLUTION OPHTHALMIC at 00:34

## 2021-07-02 ASSESSMENT — PAIN SCALES - WONG BAKER: WONGBAKER_NUMERICALRESPONSE: 0

## 2021-07-02 ASSESSMENT — PAIN SCALES - GENERAL: PAINLEVEL_OUTOF10: 0

## 2021-07-02 NOTE — PLAN OF CARE
Problem: Falls - Risk of:  Goal: Will remain free from falls  Description: Will remain free from falls  Outcome: Ongoing   Bed in lowest position, call light in reach, nonskid footwear on pt, bed alarm in place, hourly rounding in place. Problem: Falls - Risk of:  Goal: Absence of physical injury  Description: Absence of physical injury  Outcome: Ongoing     Problem: Skin Integrity:  Goal: Will show no infection signs and symptoms  Description: Will show no infection signs and symptoms  Outcome: Ongoing   Educated on s/s of infection, infection control measures in place. Problem: Skin Integrity:  Goal: Absence of new skin breakdown  Description: Absence of new skin breakdown  Outcome: Ongoing   Educated on turning every two hours while in bed.      Problem: Confusion - Acute:  Goal: Absence of continued neurological deterioration signs and symptoms  Description: Absence of continued neurological deterioration signs and symptoms  Outcome: Ongoing     Problem: Confusion - Acute:  Goal: Mental status will be restored to baseline  Description: Mental status will be restored to baseline  Outcome: Ongoing     Problem: Discharge Planning:  Goal: Ability to perform activities of daily living will improve  Description: Ability to perform activities of daily living will improve  Outcome: Ongoing     Problem: Injury - Risk of, Physical Injury:  Goal: Absence of physical injury  Description: Absence of physical injury  Outcome: Ongoing     Problem: Mood - Altered:  Goal: Mood stable  Description: Mood stable  Outcome: Ongoing     Problem: Mood - Altered:  Goal: Absence of abusive behavior  Description: Absence of abusive behavior  Outcome: Ongoing     Problem: Mood - Altered:  Goal: Verbalizations of feeling emotionally comfortable while being cared for will increase  Description: Verbalizations of feeling emotionally comfortable while being cared for will increase  Outcome: Ongoing     Problem: Psychomotor Activity - Altered:  Goal: Absence of psychomotor disturbance signs and symptoms  Description: Absence of psychomotor disturbance signs and symptoms  Outcome: Ongoing     Problem: Sensory Perception - Impaired:  Goal: Demonstrations of improved sensory functioning will increase  Description: Demonstrations of improved sensory functioning will increase  Outcome: Ongoing     Problem: Sensory Perception - Impaired:  Goal: Decrease in sensory misperception frequency  Description: Decrease in sensory misperception frequency  Outcome: Ongoing     Problem: Sensory Perception - Impaired:  Goal: Able to refrain from responding to false sensory perceptions  Description: Able to refrain from responding to false sensory perceptions  Outcome: Ongoing     Problem: Sensory Perception - Impaired:  Goal: Demonstrates accurate environmental perceptions  Description: Demonstrates accurate environmental perceptions  Outcome: Ongoing     Problem: Sensory Perception - Impaired:  Goal: Able to distinguish between reality-based and nonreality-based thinking  Description: Able to distinguish between reality-based and nonreality-based thinking  Outcome: Ongoing     Problem: Sensory Perception - Impaired:  Goal: Able to interrupt nonreality-based thinking  Description: Able to interrupt nonreality-based thinking  Outcome: Ongoing     Problem: Sleep Pattern Disturbance:  Goal: Appears well-rested  Description: Appears well-rested  Outcome: Ongoing

## 2021-07-02 NOTE — PROGRESS NOTES
Occupational Therapy   Occupational Therapy Initial Assessment  Date: 2021   Patient Name: Andrew Null  MRN: [id number]     : 1940    Date of Service: 2021    Discharge Recommendations:  Patient would benefit from continued therapy after discharge, 3-5 sessions per week  OT Equipment Recommendations  Equipment Needed: No    Andrew Null scored a 8/24 on the AM-PAC ADL Inpatient form. Current research shows that an AM-PAC score of 17 or less is typically not associated with a discharge to the patient's home setting. Based on the patient's AM-PAC score and their current ADL deficits, it is recommended that the patient have 3-5 sessions per week of Occupational Therapy at d/c to increase the patient's independence. Please see assessment section for further patient specific details. If patient discharges prior to next session this note will serve as a discharge summary. Please see below for the latest assessment towards goals. Assessment   Performance deficits / Impairments: Decreased functional mobility ; Decreased endurance;Decreased strength;Decreased safe awareness;Decreased high-level IADLs;Decreased ADL status; Decreased balance;Decreased cognition  Assessment: 81 y/o female admitted 2021 with AMS, increased fatigue and increase tremors. Pt oriented to first name only today and unable to provide social history/PLOF. Today, pt required increase time to follow very simple commands. Pt attempted to stand to RW however unable to take steps; therefore víctor conroy used to transfer to bathroom and chair. Pt required max A for toileting and max A for feeding d/t overall cognition and balance. Pt will benefit from skilled therapy while in hospital. Anticipate pt will require skilled therapy at discharge, pending pt PLOF and social support.   Prognosis: Fair  Decision Making: Medium Complexity  OT Education: OT Role;Plan of Care;Transfer Training;Orientation  Barriers to Learning: cognition  REQUIRES OT FOLLOW UP: Yes  Activity Tolerance  Activity Tolerance: Treatment limited secondary to decreased cognition  Safety Devices  Safety Devices in place: Yes  Type of devices: Left in chair;Nurse notified;Call light within reach;Gait belt; Chair alarm in place           Patient Diagnosis(es): The encounter diagnosis was Altered mental status, unspecified altered mental status type. has a past medical history of Dementia ([address] Irregular heart beat, and Mitral valve prolapse.   has a past surgical history that includes Breast biopsy (Left); knee surgery (Left); and Hand surgery (Left, 2020). Restrictions  Restrictions/Precautions  Restrictions/Precautions: Fall Risk    Subjective   General  Chart Reviewed: Yes  Patient assessed for rehabilitation services?: Yes  Additional Pertinent Hx: 81 y/o female admitted 2021 with AMS, fatigue, increased tremor. Covid negative. CT of her head is negative for ICH. urine drug screen is positive for benzodiazepines. Pt with history of dementia  Family / Caregiver Present: No  Referring Practitioner: DIXIE Warren CNP  Diagnosis: AMS, fatigue, tremors  Subjective  Subjective: Pt seen bedside. Oriented to name only. Delayed response time. Social/Functional History  Social/Functional History  Lives With: Spouse  Type of Home: House  Additional Comments: Pt with AMS and unable to provide social history. Per chart, pt has had increased difficulty ambulating recently due to AMS and increased tremors       Objective        Orientation  Overall Orientation Status: Impaired (first name only. Unable to state last name or )  Orientation Level: Disoriented to situation;Disoriented to place; Disoriented to time     Balance  Sitting Balance: Minimal assistance (EOB ~ 3-5 min in prep for transfers, slight posterior lean)  Standing Balance  Time: stood prn for toileting with víctor stedy support  Functional Mobility  Functional Mobility Comments: bed > bathroom > chair via víctor washburn  Toilet Transfers  Toilet Transfers Comments: stood mod A from toilet to [address] [place] [address]     ADL  Feeding: Dependent/Total (assist pt with breakfast (~ 20 min)- assist to cut up food, place on fork and bring to mouth. Pt unable to choose between 2 choices on plate and states \"I don't know. \" Increased time to chew and swallow. Unable to bring to mouth and keep food on fork d/t tremors. Pt was able to verbalize when she was finished eating)  Toileting: Dependent/Total (assist to lillian/doff depends and raj care while standing in stedy)  Additional Comments: Anticipate pt will be max A for ADLs based on balance and cognition observed        Bed mobility  Supine to Sit: Maximum assistance  Sit to Supine:  (pt in chair at end of session)  Transfers  Sit to stand: Moderate assistance;Minimal assistance  Stand to sit: Moderate assistance;Minimal assistance  Transfer Comments: Pt stood bed > RW with mod A x 2, however unable to gain balance to take steps. Pt [facility] with min A x 1, stood toilet > stedy mod A x 1     Cognition  Overall Cognitive Status: Exceptions  Arousal/Alertness: Delayed responses to stimuli  Following Commands: Inconsistently follows commands; Follows one step commands with repetition; Follows one step commands with increased time  Attention Span: Attends with cues to redirect  Memory: Decreased recall of recent events;Decreased short term memory  Safety Judgement: Decreased awareness of need for assistance;Decreased awareness of need for safety  Problem Solving: Decreased awareness of errors  Insights: Fully aware of deficits  Initiation: Requires cues for all  Sequencing: Requires cues for all                 LUE AROM (degrees)  LUE General AROM: appears functional- tremors noted, worse with activity/effort  RUE AROM (degrees)  RUE General AROM: appears functional- tremors noted, worse with activity/effort     Plan   Plan  Times per week: 3-5  Current Treatment Recommendations: Strengthening, Functional Mobility Training, Gait Training, Endurance Training, Balance Training, Self-Care / ADL, Cognitive Reorientation    AM-PAC Score  AM-PAC Inpatient Daily Activity Raw Score: 8 (07/02/21 0940)  AM-PAC Inpatient ADL T-Scale Score : 22.86 (07/02/21 0940)  ADL Inpatient CMS 0-100% Score: 85.69 (07/02/21 0940)  ADL Inpatient CMS G-Code Modifier : CM (07/02/21 0940)    Goals  Short term goals  Time Frame for Short term goals: Prior to DC: Short term goal 1: Pt will complete ADL transfers with min A  Short term goal 2: Pt will tolerate standing > 3 min for functional task with CGA  Short term goal 3: Pt will complete functional mobility with min A  Short term goal 4: Pt will complete toileting with min A  Patient Goals   Patient goals : no goal stated 2/2 cognition       Therapy Time   Individual Concurrent Group Co-treatment   Time In 0845         Time Out 0938         Minutes 53         Timed Code Treatment Minutes: 45 Minutes     This note to serve as OT d/c summary if pt is d/c-ed prior to next therapy session.     Anat Bell OTR/L

## 2021-07-02 NOTE — PROGRESS NOTES
4 Eyes Skin Assessment     NAME:  Tisha Yan  YOB: 1940  MEDICAL RECORD NUMBER:  [id number]    The patient is being assess for  Admission    I agree that 2 RN's have performed a thorough Head to Toe Skin Assessment on the patient. ALL assessment sites listed below have been assessed. Areas assessed by both nurses:    Head, Face, Ears, Shoulders, Back, Chest, Arms, Elbows, Hands, Sacrum. Buttock, Coccyx, Ischium and Legs. Feet and Heels        Does the Patient have a Wound?  No noted wound(s)       Kal Prevention initiated:  No   Wound Care Orders initiated:  Yes    Pressure Injury (Stage 3,4, Unstageable, DTI, NWPT, and Complex wounds) if present place consult order under [de-identified] No    New and Established Ostomies if present place consult order under : No      Nurse 1 eSignature: Electronically signed by Vinod Ruiz RN on 7/2/21 at 3:02 AM EDT    **SHARE this note so that the co-signing nurse is able to place an eSignature**    Nurse 2 eSignature: Electronically signed by Lia Melendez RN on 7/2/21 at 4:36 AM EDT

## 2021-07-02 NOTE — ED NOTES
ED SBAR report provider to [facility]. Patient to be transported to Room 4272 via stretcher by ED tech. Patient transported with bedside cardiac monitor and with IV medications infusing. IV site clean, dry, and intact. MEWS score and pain assessed as 0/10 and documented. Patient's score on the LUCERO Fall scale reviewed with receiving RN. Updated patient and family on plan of care.        Shital Davis RN  07/01/21 [id number]

## 2021-07-02 NOTE — PROGRESS NOTES
Pharmacy Medication Reconciliation Note     List of medications patient is currently taking is complete. Source of information:   1. Per phone call to patient's son   2. EMR    Notes regarding home medications:   1. Per son, patient had all AM and PM meds PTA   2. Patient on amiodarone and Eliquis for cadiac pacemaker  3. Toprol XL 25 is BID per son and EMR notes of cardiologist   4.  Latanoprost given when patient or spouse remember      Janna De La Paz, Pharmacy Intern  7/1/2021  9:40 PM

## 2021-07-02 NOTE — PROGRESS NOTES
Hospitalist Progress Note      PCP: Sandor Reyes MD    Date of Admission: 7/1/2021        Subjective:   Patient is asleep at the moment but discussed with family in the room. . No fevers this morning. .      Medications:  Reviewed    Infusion Medications    sodium chloride       Scheduled Medications    sodium chloride flush  5-40 mL Intravenous 2 times per day    potassium chloride  20 mEq Oral BID WC    ampicillin IV  2,000 mg Intravenous Q6H    cefTRIAXone (ROCEPHIN) IV  2,000 mg Intravenous Q12H    latanoprost  1 drop Ophthalmic Nightly    metoprolol succinate  25 mg Oral BID    [Held by provider] donepezil  10 mg Oral Nightly    amiodarone  200 mg Oral Daily    apixaban  5 mg Oral BID     PRN Meds: sodium chloride flush, sodium chloride, ondansetron **OR** ondansetron, polyethylene glycol, acetaminophen **OR** acetaminophen, ALPRAZolam      Intake/Output Summary (Last 24 hours) at 7/2/2021 1306  Last data filed at 7/2/2021 1301  Gross per 24 hour   Intake 1360 ml   Output --   Net 1360 ml       Exam:    BP (!) 159/97   Pulse 80   Temp 98.3 °F (36.8 °C) (Oral)   Resp 16   Ht 5' 3\" (1.6 m)   Wt 118 lb 13.3 oz (53.9 kg)   SpO2 95%   BMI 21.05 kg/m²     General appearance: Asleep, no acute distress  HEENT:  Conjunctivae/corneas clear. Neck:  No jugular venous distention. Trachea midline. Respiratory:  Normal respiratory effort. Clear to auscultation, bilaterally without Rales/Wheezes/Rhonchi. Cardiovascular: Regular rate and rhythm with normal S1/S2 . Lorean Snare Pansystolic murmur  Abdomen: Soft, non-tender, non-distended with normal bowel sounds. Musculoskeletal: No clubbing, cyanosis or edema bilaterally.    Skin: No rash on exposed skin  Neurologic: Asleep, no obvious focal deficits  Capillary Refill: Brisk,< 3 seconds   Peripheral Pulses: +2 palpable, equal bilaterally       Labs:   Recent Labs     07/01/21 1952   WBC 4.7   HGB 13.5   HCT 38.9        Recent Labs     07/01/21 1952    K 3.3*      CO2 24   BUN 25*   CREATININE 0.9   CALCIUM 9.2     Recent Labs     07/01/21 1952   AST 20   ALT 13   BILITOT 0.5   ALKPHOS 75     Recent Labs     07/01/21 1952   INR 2.01*     Recent Labs     07/01/21 1952   TROPONINI <0.01       Assessment/Plan:        -Acute metabolic encephalopathy related to bacteremia  -Enterococcus faecalis bacteremia 2/2--- unclear source--concern for infective endocarditis-patient has severe MR  -Severe mitral valve prolapse and mitral regurgitation  -Paroxysmal A.  Fib--rhythm paced--on Eliquis, amiodarone, metoprolol-stable  -Sick sinus syndrome status post permanent pacemaker in the past  -Alzheimer's dementia-on Aricept  -Essential hypertension-stable  -Hypokalemia    Plan  -Empiric antibiotics--IV ampicillin plus Rocephin for Enterococcus bacteremia with concern for endocarditis--awaiting for sensitivities  -2d Echocardiogram r/o endocarditis--May need MANE if negative  -ID consult  -Discussed with family at bedside  -Continue chronic medications  -PT/OT/case management consult for disposition-will likely need SNF    DVT Prophylaxis: Eliquis  Diet: ADULT DIET; Easy to Chew  Code Status: DNR-CCA--discussed with patient's  and son at bedside        Dasia Renteria MD

## 2021-07-02 NOTE — H&P
Hospital Medicine History & Physical      PCP: Eduard Barber MD    Date of Admission: 7/1/2021    Date of Service: Pt seen/examined on 7/01 and Admitted to Inpatient       Chief Complaint:?  AMS, fatigue, increased tremor      History Of Present Illness: The patient is a 80 y.o. female who presents to [facility] with PMHx: Dementia, pacemaker, mitral valve prolapse, glaucoma, aortic valve disease, sick sinus syndrome    Patient lives at home with her . She presented to the emergency department this evening to be evaluated accompanied by her son with reports and some change in mental status, increased fatigue and increased tremor. He explained to the ED provider that she seemed to be normal at her baseline but somewhat weak on Friday. It seemed to progress over the weekend. She had a PCP appointment on Tuesday and seemed to be able to walk in and participate in the office visit. However the following 24 to 48hours she seems to have decreased ability to ambulate, increased tremor, and very fatigued beyond her baseline. ED work-up: Patient was found to have a low-grade temperature of 100.6. She was in a paced to sinus rhythm. 2 blood cultures were obtained, urinalysis was taint obtained but was negative. Urine drug screen is positive for benzodiazepines and the patient is apparently on Xanax at bedtime. CBC is negative for leukocytosis. No anemia. Metabolic panel indicates a slightly low potassium of 3.3 but otherwise unremarkable. Lactic acid is 1.2.  TSH was obtained and pending. A VBG indicating pH 7.4/40/<30/27  Covid negative CT of her head is negative for ICH, diffuse atrophic changes and microvascular changes noted. Chest x-ray indicating a nonspecific left basilar opacity but nothing acute.   ED provider initiated a dose of ceftriaxone while awaiting cultures to cover for a possible evolving bacterial process. On my exam: Patient was resting comfortably. Her face seemed a bit flushed but she was afebrile at 98.3, temp taken per myself. Blood pressure 138/68, paced rhythm of 68 and respiratory rate of 18. She was a bit tremulous. She was not really sure why her son brought her to the hospital.  She herself is unaware of any acute illness. She is an unreliable historian at this point in time but she is pleasant and cooperative. She does have periods of anxiousness and this is consistent with her history. Family was not present during my interview and assessment therefore I was not able to confirm all medications. I did review cardiology notes and the patient was supposed to be on Eliquis and amiodarone however this was not listed on her medication list.  Dr. Xiao Quintero notes are not available for review therefore unsure of what her true medication list is. Pharmacy intern called and spoke with the patient's son and he reported that she was supposed to be on Zyprexa but it does not look like the medication has ever been picked up. He also reported she is no longer taking Aricept but we have no idea how long that had changed because it appears that is March and April this was still on her medication list.  Several of her vitamin supplementations had also been stopped. Bottom line the pharmacist and I are very unclear what the patient's true medication list is. Based on her cardiac history and the last cardiology notes I am going to resume the Eliquis and amiodarone until this can be confirmed that she should not be on it.             Past Medical History:        Diagnosis Date    Dementia ([address]     Irregular heart beat     Mitral valve prolapse        Past Surgical History:        Procedure Laterality Date    BREAST BIOPSY Left     HAND SURGERY Left 6/11/2020    CLOSED REDUCTION LEFT SMALL FINGER DISLOCATION OF METACARPOPHALANGEAL JOINT performed by Latonia Christine MD at . [address]        Medications Prior to Admission:    Prior to Admission medications    Medication Sig Start Date End Date Taking? Authorizing Provider   amiodarone (CORDARONE) 200 MG tablet Take 200 mg by mouth daily   Yes Historical Provider, MD   apixaban (ELIQUIS) 5 MG TABS tablet Take 5 mg by mouth 2 times daily   Yes Historical Provider, MD   metoprolol succinate (TOPROL XL) 25 MG extended release tablet Take 25 mg by mouth 2 times daily   Yes Historical Provider, MD   OLANZapine (ZYPREXA) 5 MG tablet Take 5 mg by mouth every morning   Yes Historical Provider, MD   latanoprost (XALATAN) 0.005 % ophthalmic solution Apply 1 drop to eye nightly 3/2/19  Yes Historical Provider, MD       Allergies:  Patient has no known allergies. Social History:  The patient currently lives at home with family    TOBACCO:   reports that she has never smoked. She has never used smokeless tobacco.  ETOH:   reports no history of alcohol use. Family History:  Reviewed in detail and negative for DM, Early CAD, Cancer, CVA. Positive as follows:    No family history on file. REVIEW OF SYSTEMS:     as noted in the HPI. All other systems reviewed and negative. PHYSICAL EXAM:    BP (!) 164/74   Pulse 81   Temp 98.5 °F (36.9 °C) (Oral)   Resp 22   Wt 118 lb 13.3 oz (53.9 kg)   SpO2 94%   BMI 21.05 kg/m²     General appearance: No apparent distress appears stated age and cooperative. HEENT Normal cephalic, atraumatic without obvious deformity. Pupils equal, round, and reactive to light. Extra ocular muscles intact. Conjunctivae/corneas clear. Neck: Supple, No jugular venous distention/bruits. Trachea midline without thyromegaly or adenopathy with full range of motion. Negative kerning's or Babinski's. Lungs: Clear to auscultation, bilaterally without Rales/Wheezes/Rhonchi with good respiratory effort.   Heart: Left subclavicular pacemaker device noted, regular rate and rhythm. Grade 4-5/6 murmur loud best heard left sternal border 2nd-4th intercostal space and left anterior axillary margin, PMI laterally displaced. Murmur also heard right sternal border 2nd-3rd intercostal space. No evidence of peripheral edema. Central peripheral pulses are strong and regular. Abdomen: Soft, non-tender or non-distended without rigidity or guarding and positive bowel sounds all four quadrants. Extremities: No clubbing, cyanosis, or edema bilaterally. Full range of motion without deformity and normal gait intact. Skin: Skin color, texture, turgor normal.  No rashes or lesions. Neurologic: Alert and oriented person, not time or place,   No hemiparesis or paralysis. Rest full and intentional tremor bilateral upper extremities, most noted with intentional movement. No facial drooping. No tongue deviation. No anisocoria. No lid lag. Speech stutter-cesar and for the most part not comprehensible. Can answer appropriately on occasion one-word yes or no. Mental status: Alert, oriented,   Capillary Refill: Acceptable  < 3 seconds  Peripheral Pulses: +3 Easily felt, not easily obliterated with pressure      CXR:  I have reviewed the CXR with the following interpretation: Nonspecific subsegmental left basilar opacity, no acute disease  EKG:  I have reviewed the EKG with the following interpretation: Sinus rhythm, first-degree AV block, rate 68, NV interval 212, , . When compared to May 2021 patient was in an atrial paced controlled rhythm.     CBC   Recent Labs     07/01/21 1952   WBC 4.7   HGB 13.5   HCT 38.9         RENAL  Recent Labs     07/01/21 1952      K 3.3*      CO2 24   BUN 25*   CREATININE 0.9     LFT'S  Recent Labs     07/01/21 1952   AST 20   ALT 13   BILITOT 0.5   ALKPHOS 75     COAG  Recent Labs     07/01/21 1952   INR 2.01*     CARDIAC ENZYMES  Recent Labs     07/01/21 1952   TROPONINI <0.01       U/A:    Lab Results   Component Value Date    COLORU DK YELLOW 07/01/2021    WBCUA 1 07/01/2021    RBCUA 7 07/01/2021    BACTERIA 1+ 02/09/2021    CLARITYU Clear 07/01/2021    SPECGRAV 1.029 07/01/2021    LEUKOCYTESUR Negative 07/01/2021    BLOODU Negative 07/01/2021    GLUCOSEU Negative 07/01/2021       ABG  No results found for: NRD0JAX, BEART, Z9ZJFPBT, PHART, THGBART, HJH9QRT, PO2ART, AMW9GSZ        Active Hospital Problems    Diagnosis Date Noted    AMS (altered mental status) [R41.82] 07/01/2021         PHYSICIANS CERTIFICATION:    I certify that Mikayla Gao is expected to be hospitalized for less than 2 midnights based on the following assessment and plan:      ASSESSMENT/PLAN:    AMS: ?  Etiology, progressive dementia medication interaction, medication cessation, metabolic in evolution, viral, bacteremia  Zyprexa currently not ordered, was not listed on her medication list but according to the patient's son had been prescribed, this would be a new medication and possibly precipitating symptom presentation as well  I will continue a bedtime Xanax as I do not want to introduce or precipitate seizure from withdrawal and I believe this will contribute to less anxiety and restlessness. Zoloft and Aricept are held at this time. Need to determine if they are currently on the patient's medication list, if they were stopped when they were stopped as this may be contributing to this change in patient's current status   UA is negative, blood cultures are in process, lactic acid was 1.2. No evidence of leukocytosis or left shift. Chest x-ray does not indicate any evidence of a bacterial pneumonia process, Covid negative, TSH 1.31, CT head negative for mass or intracranial hemorrhage, atrophic microvascular changes noted. Consult to PCP, Dr. Kirkland Miss: We need a complete and accurate current medication list  Pro-Sudheer in a.m., lactic acid in a.m., repeat CBC and metabolic panel in a.m.   We will hold off on any further

## 2021-07-02 NOTE — CARE COORDINATION
INITIAL CASE MANAGEMENT ASSESSMENT    Reviewed chart, met with patient  José Daniel) to assess possible discharge needs. Explained Case Management role/services. Living Situation: verified address, lives in a 1 story house with her , 2 VIDAL    ADLs: prior to 2 days ago she was independent     DME: prior to 2 days ago none    PT/OT Recs:   OT  \"Date of Service: 7/2/2021     Discharge Recommendations:  Patient would benefit from continued therapy after discharge, 3-5 sessions per week  OT Equipment Recommendations  Equipment Needed: Birdie Haro scored a 8/24 on the AM-PAC ADL Inpatient form. Current research shows that an AM-PAC score of 17 or less is typically not associated with a discharge to the patient's home setting. Based on the patient's AM-PAC score and their current ADL deficits, it is recommended that the patient have 3-5 sessions per week of Occupational Therapy at d/c to increase the patient's independence. Please see assessment section for further patient specific details. \"    PT-pending     Active Services: PTA none, due to increasing confusion at home  said they were looking at getting some help at home but can't remember who they had looked at. Asked  to find out over the next few days & [facility] SNF lists provided. Transportation: depending on how she recovers,  was doing all driving & if able, he can take her home at dc if that is where she ends up going     Medications: no barriers, uses [facility] on [address]    PCP: Elyssa Aburto MD      HD/PD: na    PLAN/COMMENTS: plan is undetermined at this time, depends on how quickly she recovers. Spouse is open to looking over [facility] lists. The Plan for Transition of Care is related to the following treatment goals: strengthening     The Patient spouse José Daniel) was provided with a choice of provider and agrees   with the discharge plan.  [x] Yes [] No    Freedom of choice list was provided with basic dialogue that supports the patient's individualized plan of care/goals, treatment preferences and shares the quality data associated with the providers. [x] Yes [] No      CM provided contact information for patient or family to call with any questions. CM will follow and assist as needed.     Rigo Vargas RN, BSN,   [phone]    Electronically signed by Rigo Vargas RN on 7/2/2021 at 2:36 PM

## 2021-07-02 NOTE — CONSULTS
Infectious Diseases Inpatient Consult Note    Reason for Consult:   Enterococcal bacteremia   Requesting Physician:   Dr Nicci Soto  Primary Care Physician:  Tru Melchor MD  History Obtained From:   Pt, EPIC    Admit Date: 7/1/2021  Hospital Day: 2    CHIEF COMPLAINT:       Chief Complaint   Patient presents with    Altered Mental Status     been really sleepy for the past couple days. just not acting right per son        HISTORY OF PRESENT ILLNESS:      79 yo woman with hx advanced dementia, AF, SSS (PPM in place), HTN    Pt unable to provide hx. Reviewed ED note, H&P. Presents with lethargy, increase in tremor worsening over few days    In ED 7/1, T 100.6, WBC 4.7, UA neg   Admit, started on ceftriaxone. Today 7/2, afeb  Awake yet non-verbal.  No visible distress      Past Medical History:    Past Medical History:   Diagnosis Date    Dementia ([address]     Irregular heart beat     Mitral valve prolapse        Past Surgical History:    Past Surgical History:   Procedure Laterality Date    BREAST BIOPSY Left     HAND SURGERY Left 6/11/2020    CLOSED REDUCTION LEFT SMALL FINGER DISLOCATION OF METACARPOPHALANGEAL JOINT performed by Joana Dandy, MD at . [place] [id number] Left        Current Medications:     sodium chloride flush  5-40 mL Intravenous 2 times per day    potassium chloride  20 mEq Oral BID WC    ampicillin IV  2,000 mg Intravenous Q6H    cefTRIAXone (ROCEPHIN) IV  2,000 mg Intravenous Q12H    latanoprost  1 drop Ophthalmic Nightly    metoprolol succinate  25 mg Oral BID    [Held by provider] donepezil  10 mg Oral Nightly    amiodarone  200 mg Oral Daily    apixaban  5 mg Oral BID       Allergies:  Patient has no known allergies.     Social History:    TOBACCO:    None   ETOH:    None   DRUGS:   None   MARITAL STATUS:      OCCUPATION:   None     Patient lives     Family History:   No immunodeficiency    REVIEW OF SYSTEMS:    Unable to obtain    PHYSICAL EXAM:      Vitals: Active Problem List   Diagnosis    AMS (altered mental status)       Hx dementia, AF, SSS (PPM in place), HTN    Encephalopathy    E faecalis bacteremia, community acquired, pacemaker in place   - assume seeded PM, c/c device related Enterococcal endocarditis   - removal of device if possible   -  if PM is not removed, would assume infected, treat for PM / device related endocarditis   - may need chronic suppressive antibiotics       RECOMMENDATIONS:    Cont ampicillin for now   Await Enterococcus sensitivities  EP consult - regarding feasibility of removing PM  May need MANE (may not change therapy plan)    Medical Decision Making: The following items were considered in medical decision making:  Discussion of patient care with other providers  Reviewed clinical lab tests  Reviewed radiology tests  Reviewed other diagnostic tests/interventions  Independent review of radiologic images  Microbiology cultures and other micro tests reviewed      Risk of Complications/Morbidity: High   Illness(es)/ Infection present that pose threat to bodily function. There is potential for severe exacerbation of infection/side effects of treatment.   Therapy requires intensive monitoring for antimicrobial agent toxicity    Discussed with VÍCTOR Webster MD

## 2021-07-03 PROBLEM — T82.7XXA PACEMAKER ELECTRODE INFECTION (HCC): Status: ACTIVE | Noted: 2021-07-03

## 2021-07-03 LAB
ANION GAP SERPL CALCULATED.3IONS-SCNC: 12 MMOL/L (ref 3–16)
BASOPHILS ABSOLUTE: 0 K/UL (ref 0–0.2)
BASOPHILS RELATIVE PERCENT: 0.4 %
BUN BLDV-MCNC: 12 MG/DL (ref 7–20)
CALCIUM SERPL-MCNC: 8.4 MG/DL (ref 8.3–10.6)
CHLORIDE BLD-SCNC: 100 MMOL/L (ref 99–110)
CO2: 24 MMOL/L (ref 21–32)
CREAT SERPL-MCNC: 0.8 MG/DL (ref 0.6–1.2)
EOSINOPHILS ABSOLUTE: 0 K/UL (ref 0–0.6)
EOSINOPHILS RELATIVE PERCENT: 0.4 %
GFR AFRICAN AMERICAN: >60
GFR NON-AFRICAN AMERICAN: >60
GLUCOSE BLD-MCNC: 98 MG/DL (ref 70–99)
HCT VFR BLD CALC: 43.1 % (ref 36–48)
HEMOGLOBIN: 14.9 G/DL (ref 12–16)
LYMPHOCYTES ABSOLUTE: 0.3 K/UL (ref 1–5.1)
LYMPHOCYTES RELATIVE PERCENT: 7.9 %
MAGNESIUM: 1.8 MG/DL (ref 1.8–2.4)
MCH RBC QN AUTO: 33 PG (ref 26–34)
MCHC RBC AUTO-ENTMCNC: 34.6 G/DL (ref 31–36)
MCV RBC AUTO: 95.3 FL (ref 80–100)
MONOCYTES ABSOLUTE: 0.3 K/UL (ref 0–1.3)
MONOCYTES RELATIVE PERCENT: 6.2 %
NEUTROPHILS ABSOLUTE: 3.6 K/UL (ref 1.7–7.7)
NEUTROPHILS RELATIVE PERCENT: 85.1 %
PDW BLD-RTO: 13.2 % (ref 12.4–15.4)
PLATELET # BLD: 140 K/UL (ref 135–450)
PMV BLD AUTO: 9.6 FL (ref 5–10.5)
POTASSIUM REFLEX MAGNESIUM: 3.5 MMOL/L (ref 3.5–5.1)
RBC # BLD: 4.53 M/UL (ref 4–5.2)
SODIUM BLD-SCNC: 136 MMOL/L (ref 136–145)
WBC # BLD: 4.2 K/UL (ref 4–11)

## 2021-07-03 PROCEDURE — 83735 ASSAY OF MAGNESIUM: CPT

## 2021-07-03 PROCEDURE — 36415 COLL VENOUS BLD VENIPUNCTURE: CPT

## 2021-07-03 PROCEDURE — 6370000000 HC RX 637 (ALT 250 FOR IP): Performed by: NURSE PRACTITIONER

## 2021-07-03 PROCEDURE — 94760 N-INVAS EAR/PLS OXIMETRY 1: CPT

## 2021-07-03 PROCEDURE — 6360000002 HC RX W HCPCS: Performed by: HOSPITALIST

## 2021-07-03 PROCEDURE — 2580000003 HC RX 258: Performed by: NURSE PRACTITIONER

## 2021-07-03 PROCEDURE — 99232 SBSQ HOSP IP/OBS MODERATE 35: CPT | Performed by: INTERNAL MEDICINE

## 2021-07-03 PROCEDURE — 85025 COMPLETE CBC W/AUTO DIFF WBC: CPT

## 2021-07-03 PROCEDURE — 1200000000 HC SEMI PRIVATE

## 2021-07-03 PROCEDURE — 2580000003 HC RX 258: Performed by: HOSPITALIST

## 2021-07-03 PROCEDURE — 80048 BASIC METABOLIC PNL TOTAL CA: CPT

## 2021-07-03 PROCEDURE — 99222 1ST HOSP IP/OBS MODERATE 55: CPT | Performed by: INTERNAL MEDICINE

## 2021-07-03 RX ADMIN — APIXABAN 5 MG: 5 TABLET, FILM COATED ORAL at 09:38

## 2021-07-03 RX ADMIN — Medication 10 ML: at 09:39

## 2021-07-03 RX ADMIN — LATANOPROST 1 DROP: 50 SOLUTION OPHTHALMIC at 22:03

## 2021-07-03 RX ADMIN — AMPICILLIN SODIUM 2000 MG: 2 INJECTION, POWDER, FOR SOLUTION INTRAMUSCULAR; INTRAVENOUS at 04:47

## 2021-07-03 RX ADMIN — AMPICILLIN SODIUM 2000 MG: 2 INJECTION, POWDER, FOR SOLUTION INTRAMUSCULAR; INTRAVENOUS at 15:51

## 2021-07-03 RX ADMIN — AMPICILLIN SODIUM 2000 MG: 2 INJECTION, POWDER, FOR SOLUTION INTRAMUSCULAR; INTRAVENOUS at 09:39

## 2021-07-03 RX ADMIN — ACETAMINOPHEN 650 MG: 325 TABLET ORAL at 16:04

## 2021-07-03 RX ADMIN — AMIODARONE HYDROCHLORIDE 200 MG: 200 TABLET ORAL at 09:38

## 2021-07-03 RX ADMIN — Medication 10 ML: at 22:05

## 2021-07-03 RX ADMIN — AMPICILLIN SODIUM 2000 MG: 2 INJECTION, POWDER, FOR SOLUTION INTRAMUSCULAR; INTRAVENOUS at 22:02

## 2021-07-03 RX ADMIN — METOPROLOL SUCCINATE 25 MG: 25 TABLET, FILM COATED, EXTENDED RELEASE ORAL at 09:38

## 2021-07-03 RX ADMIN — ALPRAZOLAM 0.25 MG: 0.25 TABLET ORAL at 22:01

## 2021-07-03 RX ADMIN — APIXABAN 5 MG: 5 TABLET, FILM COATED ORAL at 22:01

## 2021-07-03 RX ADMIN — METOPROLOL SUCCINATE 25 MG: 25 TABLET, FILM COATED, EXTENDED RELEASE ORAL at 22:01

## 2021-07-03 ASSESSMENT — PAIN SCALES - GENERAL
PAINLEVEL_OUTOF10: 0
PAINLEVEL_OUTOF10: 0

## 2021-07-03 NOTE — CONSULTS
History and Physical  [address]   Cardiology    Chief Complaint: sepsis    HPI:     Patient is a 80 y.o. female presents with apparent reduced mental status with apparently already dementia. She is not zofia to provide any hx. Cards consult for removal of pacer. \"Per Dr. Pam Valencia: E faecalis bacteremia, community acquired, pacemaker in place   - assume seeded PM, c/c device related Enterococcal endocarditis   - removal of device if possible   -  if PM is not removed, would assume infected, treat for PM / device related endocarditis   - may need chronic suppressive antibiotics\"      Her pacer appears to have been implanted for bradycardia by Dr. Leslee Libman in 2/2020. She was known to have severe mr by echo from prolapse with apparent chf sx. The below was found in chart. Medtronic Captiva dual chamber PPM initial in-clinic device interrogation. Battery: 12.2 years  Mode: MVP+ () bpm  Presenting rhythm: AP/VS @ 60's bpm.   Underlying rhythm: AS/VS @ 50's bpm. Patient is NOT PPM dependant during today's interrogation. Normal device and lead function noted. AP 92.2%,  0.1%  Adequate heart rate histogram.   AF burden 0%  No ventricular arrhythmias noted since 2/11/2020. No device alerts noted. Findings reviewed with the patient and the  present. Patient had the dressing and the steri strips removed from the incision site. Incision is noted to be closed with small amount of drainage noted. Patient educated on wound care, home monitoring, f/u schedule and activity restrictions. Patient is now scheduled to f/u in office with Dr. Reyes Mora on 6/16/2020.        Past Medical History:   Diagnosis Date    Dementia ([address]     Irregular heart beat     Mitral valve prolapse       Past Surgical History:   Procedure Laterality Date    BREAST BIOPSY Left     HAND SURGERY Left 6/11/2020    CLOSED REDUCTION LEFT SMALL FINGER DISLOCATION OF METACARPOPHALANGEAL JOINT performed by Johnny Hunt MD at [facility]    KNEE SURGERY Left         Medications Prior to Admission: amiodarone (CORDARONE) 200 MG tablet, Take 200 mg by mouth daily  apixaban (ELIQUIS) 5 MG TABS tablet, Take 5 mg by mouth 2 times daily  metoprolol succinate (TOPROL XL) 25 MG extended release tablet, Take 25 mg by mouth 2 times daily  OLANZapine (ZYPREXA) 5 MG tablet, Take 5 mg by mouth every morning  latanoprost (XALATAN) 0.005 % ophthalmic solution, Apply 1 drop to eye nightly    No Known Allergies   Social History     Tobacco Use    Smoking status: Never Smoker    Smokeless tobacco: Never Used   Substance Use Topics    Alcohol use: No      History reviewed. No pertinent family history.      Review of Systems:  unobtainable from patient    Objective Data:     /68   Pulse 88   Temp 98.1 °F (36.7 °C) (Axillary)   Resp 16   Ht 5' 3\" (1.6 m)   Wt 118 lb 13.3 oz (53.9 kg)   SpO2 91%   BMI 21.05 kg/m²     General appearance: appears stated age  Alert, awake, oriented x 3  Eyes:  No erythema  Head: atraumatic  Neck:  no JVD  Lungs: clear to auscultation bilaterally  Heart: normal apical impulse, regular rate and rhythm and systolic murmur: holosystolic 3/6, blowing at 2nd left intercostal space, at apex  Abdomen: soft, non-tender; bowel sounds normal; no masses,  no organomegaly  Extremities: extremities normal, atraumatic, no cyanosis or edema  Skin: Skin color, texture, turgor normal. No rashes or lesions  Hematologic: no remarkable bruising   Left pectoral implant intact      ECG: normal sinus rhythm and or atrial paced, 1st degree heart block and intraventricular conduction defect     Data Review    Recent Labs     07/01/21 1952 07/03/21  0737    136   K 3.3* 3.5    100   CO2 24 24   BUN 25* 12   CREATININE 0.9 0.8     Recent Labs     07/01/21 1952 07/03/21  0737   WBC 4.7 4.2   HGB 13.5 14.9   HCT 38.9 43.1   MCV 95.5 95.3    140     Lab Results   Component Value Date    TROPONINI <0.01 07/01/2021 Assessment:     Active Problems:    AMS (altered mental status)    Enterococcal bacteremia    Status cardiac pacemaker    Late onset Alzheimer's dementia without behavioral disturbance (HCC)    Pacemaker electrode infection ([facility] [address]  Resolved Problems:    * No resolved hospital problems. *      Plan:     1. As per Dr. Gopi Santiago, likely endocarditis and infected pacer. Patient is currently stable. Will work with team to determine best course forward. It may be that colleague would be willing to attempt removal of leads early next week since in only a little over a year. However, might need to transfer for extraction possibility. A MANE may be requested. Will investigate over holiday weekend. Spoke to [facility]. Family not here when visited.

## 2021-07-03 NOTE — PLAN OF CARE
Problem: Falls - Risk of:  Goal: Will remain free from falls  Description: Will remain free from falls  7/3/2021 0255 by Toni Dumas RN  Outcome: Ongoing     Problem: Falls - Risk of:  Goal: Absence of physical injury  Description: Absence of physical injury  7/3/2021 0255 by Toni Dumas RN  Outcome: Ongoing     Problem: Skin Integrity:  Goal: Will show no infection signs and symptoms  Description: Will show no infection signs and symptoms  7/3/2021 0255 by Toni Dumas RN  Outcome: Ongoing     Problem: Skin Integrity:  Goal: Absence of new skin breakdown  Description: Absence of new skin breakdown  7/3/2021 0255 by Toni Dumas RN  Outcome: Ongoing     Problem: Injury - Risk of, Physical Injury:  Goal: Will remain free from falls  Description: Will remain free from falls  7/3/2021 0255 by Toni Dumas RN  Outcome: Ongoing     Problem: Injury - Risk of, Physical Injury:  Goal: Absence of physical injury  Description: Absence of physical injury  7/3/2021 0255 by Toni Dumas RN  Outcome: Ongoing     Problem: Sleep Pattern Disturbance:  Goal: Appears well-rested  Description: Marisela Hamper well-rested  7/3/2021 0255 by Toni Dumas RN  Outcome: Ongoing

## 2021-07-03 NOTE — PROGRESS NOTES
Skin: No rash on exposed skin  Neurologic:  no obvious focal deficits  Capillary Refill: Brisk,< 3 seconds   Peripheral Pulses: +2 palpable, equal bilaterally       Labs:   Recent Labs     07/01/21 1952 07/03/21  0737   WBC 4.7 4.2   HGB 13.5 14.9   HCT 38.9 43.1    140     Recent Labs     07/01/21 1952 07/03/21  0737    136   K 3.3* 3.5    100   CO2 24 24   BUN 25* 12   CREATININE 0.9 0.8   CALCIUM 9.2 8.4     Recent Labs     07/01/21 1952   AST 20   ALT 13   BILITOT 0.5   ALKPHOS 75     Recent Labs     07/01/21 1952   INR 2.01*     Recent Labs     07/01/21 1952   TROPONINI <0.01       Assessment/Plan:    -Acute metabolic encephalopathy 2/2 to enterococcal bacteremia  -Enterococcus faecalis bacteremia 2/2--- unclear source though it is possible this is 2/2/ colonic neoplasm. Suspect  infective endocarditis with PM seeding-patient has severe MR  -Severe mitral valve prolapse and mitral regurgitation  -Paroxysmal A. Fib--rhythm paced--on Eliquis, amiodarone, metoprolol-stable  -Sick sinus syndrome status post permanent pacemaker in the past  -Alzheimer's dementia-on Aricept  -Essential hypertension-stable  -Hypokalemia-resolved    Plan:  -Empiric antibiotics--IV ampicillin for Enterococcus faecalis bacteremia with concern for endocarditis--awaiting for sensitivities. If VRE, will switch to vanc or linezolid   -?  Need for MANE, cards consulted   -ID following, consult placed for cardiology(eval PM removal)  -Discussed with family at bedside  -Continue chronic medications      DVT Prophylaxis: Eliquis  Diet: ADULT DIET; Easy to Chew  Code Status: DNR-CCA-  PT/OT: consulted and following      Shauna Jenkins, APRN - CNP

## 2021-07-03 NOTE — PROGRESS NOTES
ID Follow-up NOTE    CC:   Enterococcal bacteremia  Antibiotics: Ampicillin    Admit Date: 2021  Hospital Day: 3    Subjective:     Patient feels good -   No complaint, 'usual self'      Objective:     Patient Vitals for the past 8 hrs:   BP Temp Temp src Pulse Resp SpO2   21 1428 116/73 98.9 °F (37.2 °C) Axillary 88 19 94 %   21 1308 -- -- -- -- -- 93 %   21 0941 108/68 98.1 °F (36.7 °C) Axillary 88 16 91 %     I/O last 3 completed shifts: In: 370 [P.O.:220; IV Piggyback:150]  Out: -   No intake/output data recorded. EXAM:  GENERAL: No apparent distress.   RA  HEENT: Membranes moist, no oral lesion  NECK:  Supple, no lymphadenopathy  LUNGS: Clear b/l, no rales, no dullness  CARDIAC: RRR, no murmur appreciated  ABD:  + BS, soft / NT  EXT:  No rash, no edema, no lesions  NEURO: No focal neurologic findings - tremor  PSYCH: Orientation, sensorium, mood normal  LINES:  Peripheral iv       Data Review:  Lab Results   Component Value Date    WBC 4.2 2021    HGB 14.9 2021    HCT 43.1 2021    MCV 95.3 2021     2021     Lab Results   Component Value Date    CREATININE 0.8 2021    BUN 12 2021     2021    K 3.5 2021     2021    CO2 24 2021       Hepatic Function Panel:   Lab Results   Component Value Date    ALKPHOS 75 2021    ALT 13 2021    AST 20 2021    PROT 6.6 2021    BILITOT 0.5 2021    LABALBU 4.2 2021       Micro:   BC x 2 GPC, E faecalis by PCR   SARS CoV-2 NAAT neg      Imagin/1 CXR 'nonspecific subsegmental left basilar opacity'      Head CT  - No acute intracranial abnormality.   - Diffuse atrophic changes with findings suggesting chronic microvascular ischemia      Echo:   TTE   Summary   Dilated LV with normal wall motion and EF of 50%,   Grade II diastolic dysfunction with elevated LV filling pressures.   Mild aortic regurgitation.   Thickening of leaflets of mitral valve. Mitral valve prolapse of posterior   leaflet with severe medially directed eccentric regurgitation.   Mildly dilated right ventricle. Right ventricular systolic function is normal.   Mild tricuspid regurgitation.   The right atrium is normal in size.       Pacemaker / ICD lead is visualized in the right atrium    Scheduled Meds:   sodium chloride flush  5-40 mL Intravenous 2 times per day    potassium chloride  20 mEq Oral BID WC    ampicillin IV  2,000 mg Intravenous Q6H    latanoprost  1 drop Ophthalmic Nightly    metoprolol succinate  25 mg Oral BID    [Held by provider] donepezil  10 mg Oral Nightly    amiodarone  200 mg Oral Daily    apixaban  5 mg Oral BID       Continuous Infusions:   sodium chloride         PRN Meds:  sodium chloride flush, sodium chloride, ondansetron **OR** ondansetron, polyethylene glycol, acetaminophen **OR** acetaminophen, ALPRAZolam      Assessment:     Hx dementia, AF, SSS (PPM in place), HTN     Encephalopathy     E faecalis bacteremia, community acquired, pacemaker in place   - assume seeded PM, c/c device related Enterococcal endocarditis   - removal of device if possible   -  if PM is not removed, would assume infected, treat for PM / device related endocarditis   - may need chronic suppressive antibiotics       Plan:     Cont ampicillin for now   Await Enterococcus sensitivities  EP consulted - reviewed note, will determine feasibility of removing PM  May need MANE (may not change therapy plan)    Medical Decision Making:   The following items were considered in medical decision making:  Reviewed clinical lab tests  Reviewed radiology tests  Reviewed other diagnostic tests/interventions  Microbiology cultures  reviewed      Discussed with pt  Farhad Woods MD

## 2021-07-03 NOTE — PLAN OF CARE
Problem: Falls - Risk of:  Goal: Will remain free from falls  Description: Will remain free from falls  7/3/2021 1134 by Brad Canseco RN  Outcome: Ongoing  7/3/2021 0255 by Lily Sanches RN  Outcome: Ongoing  Goal: Absence of physical injury  Description: Absence of physical injury  7/3/2021 1134 by Brad Canseco RN  Outcome: Ongoing  7/3/2021 0255 by Lily Sanches RN  Outcome: Ongoing     Problem: Skin Integrity:  Goal: Will show no infection signs and symptoms  Description: Will show no infection signs and symptoms  7/3/2021 1134 by Brad Canseco RN  Outcome: Ongoing  7/3/2021 0255 by Lily Sanches RN  Outcome: Ongoing  Goal: Absence of new skin breakdown  Description: Absence of new skin breakdown  7/3/2021 1134 by Brad Canseco RN  Outcome: Ongoing  7/3/2021 0255 by Lily Sanches RN  Outcome: Ongoing     Problem: Confusion - Acute:  Goal: Absence of continued neurological deterioration signs and symptoms  Description: Absence of continued neurological deterioration signs and symptoms  Outcome: Ongoing  Goal: Mental status will be restored to baseline  Description: Mental status will be restored to baseline  Outcome: Ongoing     Problem: Discharge Planning:  Goal: Ability to perform activities of daily living will improve  Description: Ability to perform activities of daily living will improve  Outcome: Ongoing  Goal: Participates in care planning  Description: Participates in care planning  Outcome: Ongoing     Problem: Injury - Risk of, Physical Injury:  Goal: Will remain free from falls  Description: Will remain free from falls  7/3/2021 1134 by Brad Canseco RN  Outcome: Ongoing  7/3/2021 0255 by Lily Sanches RN  Outcome: Ongoing  Goal: Absence of physical injury  Description: Absence of physical injury  7/3/2021 1134 by Brad Canseco RN  Outcome: Ongoing  7/3/2021 0255 by Lily Sanches RN  Outcome: Ongoing     Problem: Mood - Altered:  Goal: Mood stable  Description: Mood stable  Outcome: Ongoing  Goal: Absence of abusive behavior  Description: Absence of abusive behavior  Outcome: Ongoing  Goal: Verbalizations of feeling emotionally comfortable while being cared for will increase  Description: Verbalizations of feeling emotionally comfortable while being cared for will increase  Outcome: Ongoing     Problem: Psychomotor Activity - Altered:  Goal: Absence of psychomotor disturbance signs and symptoms  Description: Absence of psychomotor disturbance signs and symptoms  Outcome: Ongoing     Problem: Sensory Perception - Impaired:  Goal: Demonstrations of improved sensory functioning will increase  Description: Demonstrations of improved sensory functioning will increase  Outcome: Ongoing  Goal: Decrease in sensory misperception frequency  Description: Decrease in sensory misperception frequency  Outcome: Ongoing  Goal: Able to refrain from responding to false sensory perceptions  Description: Able to refrain from responding to false sensory perceptions  Outcome: Ongoing  Goal: Demonstrates accurate environmental perceptions  Description: Demonstrates accurate environmental perceptions  Outcome: Ongoing  Goal: Able to distinguish between reality-based and nonreality-based thinking  Description: Able to distinguish between reality-based and nonreality-based thinking  Outcome: Ongoing  Goal: Able to interrupt nonreality-based thinking  Description: Able to interrupt nonreality-based thinking  Outcome: Ongoing     Problem: Sleep Pattern Disturbance:  Goal: Appears well-rested  Description: Appears well-rested  7/3/2021 1134 by Fabi Silva RN  Outcome: Ongoing  7/3/2021 0255 by Christopher Razo RN  Outcome: Ongoing

## 2021-07-04 LAB
BLOOD CULTURE, ROUTINE: ABNORMAL
BLOOD CULTURE, ROUTINE: ABNORMAL
CULTURE, BLOOD 2: ABNORMAL
ORGANISM: ABNORMAL

## 2021-07-04 PROCEDURE — 2580000003 HC RX 258: Performed by: NURSE PRACTITIONER

## 2021-07-04 PROCEDURE — 94761 N-INVAS EAR/PLS OXIMETRY MLT: CPT

## 2021-07-04 PROCEDURE — 6360000002 HC RX W HCPCS: Performed by: HOSPITALIST

## 2021-07-04 PROCEDURE — 6370000000 HC RX 637 (ALT 250 FOR IP): Performed by: NURSE PRACTITIONER

## 2021-07-04 PROCEDURE — 1200000000 HC SEMI PRIVATE

## 2021-07-04 PROCEDURE — 99232 SBSQ HOSP IP/OBS MODERATE 35: CPT | Performed by: INTERNAL MEDICINE

## 2021-07-04 PROCEDURE — 2580000003 HC RX 258: Performed by: HOSPITALIST

## 2021-07-04 RX ADMIN — METOPROLOL SUCCINATE 25 MG: 25 TABLET, FILM COATED, EXTENDED RELEASE ORAL at 09:50

## 2021-07-04 RX ADMIN — METOPROLOL SUCCINATE 25 MG: 25 TABLET, FILM COATED, EXTENDED RELEASE ORAL at 22:04

## 2021-07-04 RX ADMIN — POTASSIUM CHLORIDE 20 MEQ: 20 TABLET, EXTENDED RELEASE ORAL at 17:23

## 2021-07-04 RX ADMIN — Medication 10 ML: at 12:24

## 2021-07-04 RX ADMIN — AMPICILLIN SODIUM 2000 MG: 2 INJECTION, POWDER, FOR SOLUTION INTRAMUSCULAR; INTRAVENOUS at 12:23

## 2021-07-04 RX ADMIN — AMPICILLIN SODIUM 2000 MG: 2 INJECTION, POWDER, FOR SOLUTION INTRAMUSCULAR; INTRAVENOUS at 17:23

## 2021-07-04 RX ADMIN — AMPICILLIN SODIUM 2000 MG: 2 INJECTION, POWDER, FOR SOLUTION INTRAMUSCULAR; INTRAVENOUS at 22:04

## 2021-07-04 RX ADMIN — LATANOPROST 1 DROP: 50 SOLUTION OPHTHALMIC at 22:05

## 2021-07-04 RX ADMIN — AMIODARONE HYDROCHLORIDE 200 MG: 200 TABLET ORAL at 09:50

## 2021-07-04 RX ADMIN — APIXABAN 5 MG: 5 TABLET, FILM COATED ORAL at 09:50

## 2021-07-04 RX ADMIN — POTASSIUM CHLORIDE 20 MEQ: 20 TABLET, EXTENDED RELEASE ORAL at 09:50

## 2021-07-04 RX ADMIN — AMPICILLIN SODIUM 2000 MG: 2 INJECTION, POWDER, FOR SOLUTION INTRAMUSCULAR; INTRAVENOUS at 05:50

## 2021-07-04 RX ADMIN — APIXABAN 5 MG: 5 TABLET, FILM COATED ORAL at 22:04

## 2021-07-04 ASSESSMENT — PAIN SCALES - PAIN ASSESSMENT IN ADVANCED DEMENTIA (PAINAD)
NEGVOCALIZATION: 0
BODYLANGUAGE: 0
FACIALEXPRESSION: 0
TOTALSCORE: 0
BREATHING: 0
CONSOLABILITY: 0

## 2021-07-04 ASSESSMENT — PAIN SCALES - GENERAL
PAINLEVEL_OUTOF10: 0

## 2021-07-04 NOTE — PROGRESS NOTES
Hospitalist Progress Note      PCP: Ana Green MD    Date of Admission: 7/1/2021    HPI: Pt admitted 7/1/21 with AMS. Bld Cx 2/2 + E. Faecalis. Echo 7/2/21- Grade II DD,Mitral valve prolapse of posterior leaflet with severe medially directed eccentric regurgitation     Subjective:   Pt is awake, speech largely nonsensical. Her  is present bedside. Prepping for lunch. Ate yogurt for breakfast. Looking slightly better today per family. Afebrile. Tolerating Abx. D/C plan: Possible SNF, though hospitalization will likely be prolonged. Medications:  Reviewed    Infusion Medications    sodium chloride       Scheduled Medications    sodium chloride flush  5-40 mL Intravenous 2 times per day    potassium chloride  20 mEq Oral BID WC    ampicillin IV  2,000 mg Intravenous Q6H    latanoprost  1 drop Ophthalmic Nightly    metoprolol succinate  25 mg Oral BID    [Held by provider] donepezil  10 mg Oral Nightly    amiodarone  200 mg Oral Daily    apixaban  5 mg Oral BID     PRN Meds: sodium chloride flush, sodium chloride, ondansetron **OR** ondansetron, polyethylene glycol, acetaminophen **OR** acetaminophen, ALPRAZolam      Intake/Output Summary (Last 24 hours) at 7/4/2021 1343  Last data filed at 7/3/2021 1909  Gross per 24 hour   Intake 120 ml   Output 350 ml   Net -230 ml       Exam:    /69   Pulse 66   Temp 98.2 °F (36.8 °C) (Axillary)   Resp 16   Ht 5' 3\" (1.6 m)   Wt 119 lb 4.3 oz (54.1 kg)   SpO2 94%   BMI 21.13 kg/m²     General appearance: Awake, alert, no acute distress  HEENT:  Conjunctivae/corneas clear. Neck:  No jugular venous distention. Trachea midline. Respiratory:  Normal respiratory effort. Clear to auscultation, bilaterally without Rales/Wheezes/Rhonchi. Cardiovascular: Regular rate and rhythm with normal S1/S2 . Sonu Confer Pansystolic murmur  Abdomen: Soft, non-tender, non-distended with normal bowel sounds.   Musculoskeletal: No clubbing, cyanosis or edema bilaterally. Skin: No rash on exposed skin  Neurologic:  no obvious focal deficits  Capillary Refill: Brisk,< 3 seconds   Peripheral Pulses: +2 palpable, equal bilaterally       Labs:   Recent Labs     07/01/21 1952 07/03/21  0737   WBC 4.7 4.2   HGB 13.5 14.9   HCT 38.9 43.1    140     Recent Labs     07/01/21 1952 07/03/21  0737    136   K 3.3* 3.5    100   CO2 24 24   BUN 25* 12   CREATININE 0.9 0.8   CALCIUM 9.2 8.4     Recent Labs     07/01/21 1952   AST 20   ALT 13   BILITOT 0.5   ALKPHOS 75     Recent Labs     07/01/21 1952   INR 2.01*     Recent Labs     07/01/21 1952   TROPONINI <0.01       Assessment/Plan:    -Acute metabolic encephalopathy 2/2 to enterococcal bacteremia  -Enterococcus faecalis bacteremia 2/2--- unclear source though it is possible this is 2/2/ colonic neoplasm. Suspect  infective endocarditis with PM seeding-patient has severe MR  -Severe mitral valve prolapse and mitral regurgitation  -Paroxysmal A. Fib--rhythm paced--on Eliquis, amiodarone, metoprolol-stable  -Sick sinus syndrome status post permanent pacemaker in the past  -Alzheimer's dementia-on Aricept  -Essential hypertension-stable  -Hypokalemia-resolved    Plan:  -Empiric antibiotics--IV ampicillin for Enterococcus faecalis bacteremia with concern for endocarditis--awaiting for sensitivities. If VRE, will switch to vanc or linezolid   -?  Need for MANE, cards consulted   -ID following, consult placed for cardiology(eval PM removal)  -Discussed with family at bedside  -Continue chronic medications      DVT Prophylaxis: Eliquis  Diet: ADULT DIET; Easy to Chew  Code Status: DNR-CCA-  PT/OT: consulted and following      Shannan Castle MD

## 2021-07-04 NOTE — PROGRESS NOTES
[facility]              Progress Note      Admit Date 2021  HPI: no change in status per notes. Still having fever    Interval history:     Ms. Lilliana Waite communicates poorly with dementia. Subjective:       Scheduled Meds:   sodium chloride flush  5-40 mL Intravenous 2 times per day    potassium chloride  20 mEq Oral BID WC    ampicillin IV  2,000 mg Intravenous Q6H    latanoprost  1 drop Ophthalmic Nightly    metoprolol succinate  25 mg Oral BID    [Held by provider] donepezil  10 mg Oral Nightly    amiodarone  200 mg Oral Daily    apixaban  5 mg Oral BID     Continuous Infusions:   sodium chloride       PRN Meds:sodium chloride flush, sodium chloride, ondansetron **OR** ondansetron, polyethylene glycol, acetaminophen **OR** acetaminophen, ALPRAZolam       Objective: Wt Readings from Last 3 Encounters:   21 119 lb 4.3 oz (54.1 kg)   21 115 lb 11.9 oz (52.5 kg)   11/10/20 115 lb 15.4 oz (52.6 kg)   Admit weight: Weight: 118 lb 13.3 oz (53.9 kg)      Temperature range over 24hrs:   Temp  Av.8 °F (37.1 °C)  Min: 98.2 °F (36.8 °C)  Max: 100.7 °F (38.2 °C)  Current Respiratory Rate:  Resp: 16  Current Pulse:  Pulse: 65  Current Blood Pressure:  BP: 123/70  24hr Blood Pressure Range:  Systolic (98VBQ), DFQ:369 , Min:107 , FHB:661   ; Diastolic (75OXA), JMU:41, Min:61, Max:73    Current Pulse Oximetry:  SpO2: 94 %      Intake/Output Summary (Last 24 hours) at 2021 1420  Last data filed at 7/3/2021 1909  Gross per 24 hour   Intake 120 ml   Output 350 ml   Net -230 ml       Telemetry monitor:     Physical Exam:  General:  Awakens,  NAD  Skin:  Warm and dry  Neck:  No JVD  Chest:  Clear to auscultation, respiration easy  Cardiovascular:  RRR S1S2 no murmur to auscultation  Abdomen:   Bowel sounds normal, abd soft, non-tender  Extremities:  No edema  : unremarkable      Imaging      Lab Review     Renal Profile:   Lab Results   Component Value Date    CREATININE 0.8 07/03/2021    BUN 12 07/03/2021     07/03/2021    K 3.5 07/03/2021     07/03/2021    CO2 24 07/03/2021     CBC:    Lab Results   Component Value Date    WBC 4.2 07/03/2021    RBC 4.53 07/03/2021    HGB 14.9 07/03/2021    HCT 43.1 07/03/2021    MCV 95.3 07/03/2021    RDW 13.2 07/03/2021     07/03/2021     BNP:  No results found for: BNP  Fasting Lipid Panel:  No results found for: CHOL, HDL, TRIG  Cardiac Enzymes:    Lab Results   Component Value Date    TROPONINI <0.01 07/01/2021     PT/ INR   Lab Results   Component Value Date    INR 2.01 07/01/2021    INR 1.01 12/14/2019    PROTIME 23.4 07/01/2021    PROTIME 11.7 12/14/2019     PTT No results found for: PTT   Lab Results   Component Value Date    MG 1.80 07/03/2021    No results found for: TSH    Assessment/Plan:     Patient Active Problem List   Diagnosis    AMS (altered mental status)    Enterococcal bacteremia    Status cardiac pacemaker    Late onset Alzheimer's dementia without behavioral disturbance ([address]    Pacemaker electrode infection ([facility] [address]        A MANE would be helpful it large vegetations were missed on TTE. Otherwise would not likely change plan. Not sure when DtColleen Randle will return from being out of town. Will see if he can contacted tomorrow, otherwise likely Tuesday. He needs to set up anesthesia if extraction is needed. If MANE considered doable in view of her mental state, it might be helpful. Saw her  who seemed confused as well. The POA is not clear but RN figuring it out.

## 2021-07-04 NOTE — PLAN OF CARE
Problem: Falls - Risk of: bed alarm in place and call light within reach  Goal: Will remain free from falls  Description: Will remain free from falls  7/4/2021 0115 by Sebastien Price RN  Outcome: Ongoing  7/3/2021 1134 by Romeo Schreiber RN  Outcome: Ongoing  Goal: Absence of physical injury  Description: Absence of physical injury  7/4/2021 0115 by Sebastien Price RN  Outcome: Ongoing  7/3/2021 1134 by Romeo Schreiber RN  Outcome: Ongoing     Problem: Skin Integrity:  Goal: Will show no infection signs and symptoms  Description: Will show no infection signs and symptoms  7/4/2021 0115 by Sebastien Price RN  Outcome: Ongoing  7/3/2021 1134 by Romeo Schreiber RN  Outcome: Ongoing  Goal: Absence of new skin breakdown  Description: Absence of new skin breakdown  7/4/2021 0115 by Sebastien Price RN  Outcome: Ongoing  7/3/2021 1134 by Romeo Schreiber RN  Outcome: Ongoing     Problem: Confusion - Acute:  Goal: Absence of continued neurological deterioration signs and symptoms  Description: Absence of continued neurological deterioration signs and symptoms  7/4/2021 0115 by Sebastien Price RN  Outcome: Ongoing  7/3/2021 1134 by Romeo Schreiber RN  Outcome: Ongoing  Goal: Mental status will be restored to baseline  Description: Mental status will be restored to baseline  7/4/2021 0115 by Sebastien Price RN  Outcome: Ongoing  7/3/2021 1134 by Romeo Schreiber RN  Outcome: Ongoing     Problem: Discharge Planning:  Goal: Ability to perform activities of daily living will improve  Description: Ability to perform activities of daily living will improve  7/4/2021 0115 by Sebastien Price RN  Outcome: Ongoing  7/3/2021 1134 by Romeo Schreiber RN  Outcome: Ongoing  Goal: Participates in care planning  Description: Participates in care planning  7/4/2021 0115 by Sebastien Price RN  Outcome: Ongoing  7/3/2021 1134 by Romeo Schreiber RN  Outcome: Ongoing     Problem: Injury - Risk of, Physical Injury:  Goal: Will remain free from falls  Description: Will remain free from falls  7/4/2021 0115 by Alisha Cheung RN  Outcome: Ongoing  7/3/2021 1134 by Winston Mariscal RN  Outcome: Ongoing  Goal: Absence of physical injury  Description: Absence of physical injury  7/4/2021 0115 by Alisha Cheung RN  Outcome: Ongoing  7/3/2021 1134 by Winston Mariscal RN  Outcome: Ongoing     Problem: Mood - Altered:  Goal: Mood stable  Description: Mood stable  7/4/2021 0115 by Alisha Cheung RN  Outcome: Ongoing  7/3/2021 1134 by Winston Mariscal RN  Outcome: Ongoing  Goal: Absence of abusive behavior  Description: Absence of abusive behavior  7/4/2021 0115 by Alisha Cheung RN  Outcome: Ongoing  7/3/2021 1134 by Winston Mariscal RN  Outcome: Ongoing  Goal: Verbalizations of feeling emotionally comfortable while being cared for will increase  Description: Verbalizations of feeling emotionally comfortable while being cared for will increase  7/4/2021 0115 by Alisha Cheung RN  Outcome: Ongoing  7/3/2021 1134 by Winston Mariscal RN  Outcome: Ongoing     Problem: Psychomotor Activity - Altered:  Goal: Absence of psychomotor disturbance signs and symptoms  Description: Absence of psychomotor disturbance signs and symptoms  7/4/2021 0115 by Alisha Cheung RN  Outcome: Ongoing  7/3/2021 1134 by Winston Mariscal RN  Outcome: Ongoing     Problem: Sensory Perception - Impaired:  Goal: Demonstrations of improved sensory functioning will increase  Description: Demonstrations of improved sensory functioning will increase  7/4/2021 0115 by Alisha Cheung RN  Outcome: Ongoing  7/3/2021 1134 by Winston Mariscal RN  Outcome: Ongoing  Goal: Decrease in sensory misperception frequency  Description: Decrease in sensory misperception frequency  7/4/2021 0115 by Alisha Cheung RN  Outcome: Ongoing  7/3/2021 1134 by Winston Mariscal RN  Outcome: Ongoing  Goal: Able to refrain from responding to false sensory perceptions  Description: Able to refrain from responding to false sensory perceptions  7/4/2021 0115 by Alisha Cheung RN  Outcome: Ongoing  7/3/2021 1134 by Zuri Nguyễn RN  Outcome: Ongoing  Goal: Demonstrates accurate environmental perceptions  Description: Demonstrates accurate environmental perceptions  7/4/2021 0115 by Ayan Alexis RN  Outcome: Ongoing  7/3/2021 1134 by Zuri Nguyễn RN  Outcome: Ongoing  Goal: Able to distinguish between reality-based and nonreality-based thinking  Description: Able to distinguish between reality-based and nonreality-based thinking  7/4/2021 0115 by Ayan Alexis RN  Outcome: Ongoing  7/3/2021 1134 by Zuri Nguyễn RN  Outcome: Ongoing  Goal: Able to interrupt nonreality-based thinking  Description: Able to interrupt nonreality-based thinking  7/4/2021 0115 by Ayan Alexis RN  Outcome: Ongoing  7/3/2021 1134 by Zuri Nguyễn RN  Outcome: Ongoing     Problem: Sleep Pattern Disturbance:  Goal: Appears well-rested  Description: Appears well-rested  7/4/2021 0115 by Ayna Alexis RN  Outcome: Ongoing  7/3/2021 1134 by Zuri Nguyễn RN  Outcome: Ongoing

## 2021-07-04 NOTE — PLAN OF CARE
Problem: Falls - Risk of:  Goal: Will remain free from falls  Description: Will remain free from falls  7/4/2021 1055 by Nelsy Clark RN  Outcome: Ongoing  7/4/2021 0115 by Rui Tan RN  Outcome: Ongoing  Goal: Absence of physical injury  Description: Absence of physical injury  7/4/2021 1055 by Nelsy Clark RN  Outcome: Ongoing  7/4/2021 0115 by Rui Tan RN  Outcome: Ongoing

## 2021-07-05 PROCEDURE — 6360000002 HC RX W HCPCS: Performed by: HOSPITALIST

## 2021-07-05 PROCEDURE — 6370000000 HC RX 637 (ALT 250 FOR IP): Performed by: INTERNAL MEDICINE

## 2021-07-05 PROCEDURE — 6370000000 HC RX 637 (ALT 250 FOR IP): Performed by: NURSE PRACTITIONER

## 2021-07-05 PROCEDURE — 99233 SBSQ HOSP IP/OBS HIGH 50: CPT | Performed by: INTERNAL MEDICINE

## 2021-07-05 PROCEDURE — 1200000000 HC SEMI PRIVATE

## 2021-07-05 PROCEDURE — 97530 THERAPEUTIC ACTIVITIES: CPT

## 2021-07-05 PROCEDURE — 94761 N-INVAS EAR/PLS OXIMETRY MLT: CPT

## 2021-07-05 PROCEDURE — 2580000003 HC RX 258: Performed by: NURSE PRACTITIONER

## 2021-07-05 PROCEDURE — 2580000003 HC RX 258: Performed by: HOSPITALIST

## 2021-07-05 RX ADMIN — AMPICILLIN SODIUM 2000 MG: 2 INJECTION, POWDER, FOR SOLUTION INTRAMUSCULAR; INTRAVENOUS at 22:03

## 2021-07-05 RX ADMIN — AMIODARONE HYDROCHLORIDE 200 MG: 200 TABLET ORAL at 10:00

## 2021-07-05 RX ADMIN — METOPROLOL SUCCINATE 25 MG: 25 TABLET, FILM COATED, EXTENDED RELEASE ORAL at 10:00

## 2021-07-05 RX ADMIN — APIXABAN 2.5 MG: 2.5 TABLET, FILM COATED ORAL at 10:00

## 2021-07-05 RX ADMIN — POTASSIUM CHLORIDE 20 MEQ: 20 TABLET, EXTENDED RELEASE ORAL at 16:59

## 2021-07-05 RX ADMIN — METOPROLOL SUCCINATE 25 MG: 25 TABLET, FILM COATED, EXTENDED RELEASE ORAL at 20:04

## 2021-07-05 RX ADMIN — LATANOPROST 1 DROP: 50 SOLUTION OPHTHALMIC at 20:02

## 2021-07-05 RX ADMIN — AMPICILLIN SODIUM 2000 MG: 2 INJECTION, POWDER, FOR SOLUTION INTRAMUSCULAR; INTRAVENOUS at 04:34

## 2021-07-05 RX ADMIN — AMPICILLIN SODIUM 2000 MG: 2 INJECTION, POWDER, FOR SOLUTION INTRAMUSCULAR; INTRAVENOUS at 16:59

## 2021-07-05 RX ADMIN — Medication 10 ML: at 10:01

## 2021-07-05 RX ADMIN — POTASSIUM CHLORIDE 20 MEQ: 20 TABLET, EXTENDED RELEASE ORAL at 10:00

## 2021-07-05 RX ADMIN — AMPICILLIN SODIUM 2000 MG: 2 INJECTION, POWDER, FOR SOLUTION INTRAMUSCULAR; INTRAVENOUS at 10:01

## 2021-07-05 RX ADMIN — APIXABAN 2.5 MG: 2.5 TABLET, FILM COATED ORAL at 20:04

## 2021-07-05 ASSESSMENT — PAIN SCALES - WONG BAKER
WONGBAKER_NUMERICALRESPONSE: 0

## 2021-07-05 ASSESSMENT — PAIN SCALES - GENERAL
PAINLEVEL_OUTOF10: 0

## 2021-07-05 NOTE — PROGRESS NOTES
This patient needed some coachingtoswallow during her morning medication, but she was able to take allof her morning medication whole (potassium split in half) in applesauce. Patient is communicating her wants and needs effectively, and responding appropriately to open ended questions    This patient's  (claiming to be her son) came out into the hallway and alerted Julee Zapata RN that this patient was beginning to get restless and might start itching. This nurse was alerted by Julee Zapata RN about the issue, and went in to evaluate what was going on with the patient. After a conversation with the patient, this nurse applied lotion to this patient's hands and KATHIA Leiva applied lotion to this patient.

## 2021-07-05 NOTE — PROGRESS NOTES
Occupational Therapy  Facility/Department: [address] MED SURG  Daily Treatment Note  NAME: Guille Menendez  : 89  MRN: [id number]    Date of Service: 2021    Assessment: Pt tolerated session fair this date - continues to be limited by decreased cognition. Pt completed bed mobility with max A x2 and sit <> stand transfer with mod Ax2 however sat to recliner chair with min A. Anticipate pt to require max A for ADL needs. Pt would benefit from continued therapy to increase mobility, safety, and independence. Discharge Recommendations:  Patient would benefit from continued therapy after discharge, 3-5 sessions per week     Guille Menendez scored a  on the AM-PAC ADL Inpatient form. Current research shows that an AM-PAC score of 17 or less is typically not associated with a discharge to the patient's home setting. Based on the patient's AM-PAC score and their current ADL deficits, it is recommended that the patient have 3-5 sessions per week of Occupational Therapy at d/c to increase the patient's independence. Please see assessment section for further patient specific details. If patient discharges prior to next session this note will serve as a discharge summary. Please see below for the latest assessment towards goals. Assessment   Performance deficits / Impairments: Decreased functional mobility ; Decreased endurance;Decreased strength;Decreased safe awareness;Decreased high-level IADLs;Decreased ADL status; Decreased balance;Decreased cognition  Assessment: Pt tolerated session fair this date - continues to be limited by decreased cognition. Pt completed bed mobility with max A x2 and sit <> stand transfer with mod Ax2 however sat to recliner chair with min A. Anticipate pt to require max A for ADL needs. Pt would benefit from continued therapy to increase mobility, safety, and independence.   OT Education: OT Role;Plan of Care;Transfer Training;Orientation  Barriers to Learning: cognition  REQUIRES OT FOLLOW UP: Yes  Activity Tolerance  Activity Tolerance: Treatment limited secondary to decreased cognition  Safety Devices  Safety Devices in place: Yes (VÍCTOR Dobson) aware)  Type of devices: Left in chair;Nurse notified;Call light within reach;Gait belt; Chair alarm in place         Patient Diagnosis(es): The encounter diagnosis was Altered mental status, unspecified altered mental status type. has a past medical history of Dementia ([address] Irregular heart beat, and Mitral valve prolapse.   has a past surgical history that includes Breast biopsy (Left); knee surgery (Left); and Hand surgery (Left, 6/11/2020). Restrictions  Restrictions/Precautions  Restrictions/Precautions: Fall Risk     Subjective   General  Chart Reviewed: Yes  Patient assessed for rehabilitation services?: Yes  Additional Pertinent Hx: 79 y/o female admitted 7/1/2021 with AMS, fatigue, increased tremor. Covid negative. CT of her head is negative for ICH. urine drug screen is positive for benzodiazepines. Pt with history of dementia  Family / Caregiver Present: No  Referring Practitioner: DIXIE Desai CNP  Diagnosis: AMS, fatigue, tremors  Subjective  Subjective: Pt met bedside, agreeable for therapy treatment session. Vital Signs  Patient Currently in Pain:  (No complaints of pain)     Objective    ADL  Toileting:  (purwick in place)        Balance  Sitting Balance:  (initial min A for sitting balance progressed to CGA)  Standing Balance:  (CGA/min A in stedy)    Functional Mobility  Functional Mobility Comments: Use of víctor stedy    Bed mobility  Supine to Sit: Maximum assistance;2 Person assistance  Sit to Supine:  (pt in chair at end of session)     Transfers  Sit to stand: Moderate assistance;2 Person assistance  Stand to sit: Minimal assistance  Transfer Comments:  Mod Ax2 for sit <> stand from EOB to víctor stedy and sit to recliner chair        Cognition  Overall Cognitive Status: Exceptions  Arousal/Alertness: Delayed responses to stimuli  Following Commands: Inconsistently follows commands; Follows one step commands with repetition; Follows one step commands with increased time  Attention Span: Attends with cues to redirect  Memory: Decreased recall of recent events;Decreased short term memory  Safety Judgement: Decreased awareness of need for assistance;Decreased awareness of need for safety  Problem Solving: Decreased awareness of errors  Insights: Fully aware of deficits  Initiation: Requires cues for all  Sequencing: Requires cues for all           Plan   Plan  Times per week: 3-5  Current Treatment Recommendations: Strengthening, Functional Mobility Training, Gait Training, Endurance Training, Balance Training, Self-Care / ADL, Cognitive Reorientation    AM-PAC Score    Maritza Haro scored a 9/24 on the AM-PAC ADL Inpatient form. Current research shows that an AM-PAC score of 17 or less is typically not associated with a discharge to the patient's home setting. Based on the patient's AM-PAC score and their current ADL deficits, it is recommended that the patient have 3-5 sessions per week of Occupational Therapy at d/c to increase the patient's independence. Please see assessment section for further patient specific details. If patient discharges prior to next session this note will serve as a discharge summary. Please see below for the latest assessment towards goals.         AM-PAC Inpatient Daily Activity Raw Score: 9 (07/05/21 1129)  AM-PAC Inpatient ADL T-Scale Score : 25.33 (07/05/21 1129)  ADL Inpatient CMS 0-100% Score: 79.59 (07/05/21 1129)  ADL Inpatient CMS G-Code Modifier : CL (07/05/21 1129)    Goals  Short term goals  Time Frame for Short term goals: Prior to DC: status of goals ongoing as of 7/5/21  Short term goal 1: Pt will complete ADL transfers with min A  Short term goal 2: Pt will tolerate standing > 3 min for functional task with CGA  Short term goal 3: Pt will complete functional mobility with min A  Short term goal 4: Pt will complete toileting with min A  Patient Goals   Patient goals : no goal stated 2/2 cognition       Therapy Time   Individual Concurrent Group Co-treatment   Time In       1100   Time Out       1125   Minutes       25   Timed Code Treatment Minutes: 25 Minutes     If pt is discharged prior to next OT session, this note will serve as the discharge summary.     Sreedhar Martinez, RXS/G#[id number]

## 2021-07-05 NOTE — PLAN OF CARE
Impaired:  Goal: Demonstrations of improved sensory functioning will increase  Description: Demonstrations of improved sensory functioning will increase  Outcome: Ongoing  Goal: Decrease in sensory misperception frequency  Description: Decrease in sensory misperception frequency  Outcome: Ongoing  Goal: Able to refrain from responding to false sensory perceptions  Description: Able to refrain from responding to false sensory perceptions  Outcome: Ongoing  Goal: Demonstrates accurate environmental perceptions  Description: Demonstrates accurate environmental perceptions  Outcome: Ongoing  Goal: Able to distinguish between reality-based and nonreality-based thinking  Description: Able to distinguish between reality-based and nonreality-based thinking  Outcome: Ongoing  Goal: Able to interrupt nonreality-based thinking  Description: Able to interrupt nonreality-based thinking  Outcome: Ongoing     Problem: Sleep Pattern Disturbance:  Goal: Appears well-rested  Description: Appears well-rested  Outcome: Ongoing

## 2021-07-05 NOTE — PROGRESS NOTES
Physical Therapy    Facility/Department: [address] MED SURG  Initial Assessment    NAME: Andrew Null  :   MRN: [id number]    Date of Service: 2021    Discharge Recommendations:  Patient would benefit from continued therapy after discharge, 3-5 sessions per week     Andrew Null scored a / on the AM-PAC short mobility form. Current research shows that an AM-PAC score of 17 or less is typically not associated with a discharge to the patient's home setting. Based on the patient's AM-PAC score and their current functional mobility deficits, it is recommended that the patient have 3-5 sessions per week of Physical Therapy at d/c to increase the patient's independence. Please see assessment section for further patient specific details. If patient discharges prior to next session this note will serve as a discharge summary. Please see below for the latest assessment towards goals. Assessment   Body structures, Functions, Activity limitations: Decreased functional mobility ; Decreased ADL status; Decreased strength;Decreased cognition;Decreased endurance;Decreased posture;Decreased balance  Assessment: The patient is a 80 y.o. female who presents to [facility] on 21 to be evaluated for some change in mental status, increased fatigue and increased tremor. PMHx: Dementia, pacemaker, mitral valve prolapse, glaucoma, aortic valve disease, sick sinus. Pt with unkown PLOF - lived with spouse. Pt currently requiring 2 person assist and LIFT equipment for mobility. Recommend continued skilled therapy 3-5x/week to maximize independence and safety with functional mobility. Treatment Diagnosis: impaired mobility  Prognosis: Fair;Poor  Decision Making: Medium Complexity  History: see below  Exam: see below  Clinical Presentation: evolving  PT Education: PT Role;Plan of Care;General Safety; Functional Mobility Training;Orientation  Barriers to Learning: cognition  REQUIRES PT FOLLOW UP: Yes  Activity Tolerance  Activity Tolerance: Patient limited by cognitive status       Patient Diagnosis(es): The encounter diagnosis was Altered mental status, unspecified altered mental status type. has a past medical history of Dementia ([address] Irregular heart beat, and Mitral valve prolapse.   has a past surgical history that includes Breast biopsy (Left); knee surgery (Left); and Hand surgery (Left, 6/11/2020). Restrictions  Restrictions/Precautions  Restrictions/Precautions: Fall Risk     Vision/Hearing  Hearing: Within functional limits       Subjective  General  Chart Reviewed: Yes  Patient assessed for rehabilitation services?: Yes  Additional Pertinent Hx: The patient is a 80 y.o. female who presents to [facility] on [facility]/1/[facility] to be evaluated for some change in mental status, increased fatigue and increased tremor. PMHx: Dementia, pacemaker, mitral valve prolapse, glaucoma, aortic valve disease, sick sinus. Response To Previous Treatment: Not applicable  Family / Caregiver Present: No  Referring Practitioner: DIXIE Morel CNP  Referral Date : 07/01/21  Diagnosis: AMS  Follows Commands: Within Functional Limits  Subjective  Subjective: Pt is agreeable to PT  Pain Screening  Patient Currently in Pain: No          Orientation  Orientation  Overall Orientation Status: Impaired (oriented to first name only)     Social/Functional History  Social/Functional History  Lives With: Spouse  Type of Home: House  Additional Comments: Pt with AMS and unable to provide social history. Per chart, pt has had increased difficulty ambulating recently due to AMS and increased tremors     Cognition   Cognition  Overall Cognitive Status: Exceptions  Arousal/Alertness: Delayed responses to stimuli  Following Commands: Inconsistently follows commands; Follows one step commands with repetition; Follows one step commands with increased time  Attention Span: Attends with cues to redirect  Memory: Decreased recall of recent events;Decreased short term memory  Safety Judgement: Decreased awareness of need for assistance;Decreased awareness of need for safety  Problem Solving: Decreased awareness of errors  Insights: Fully aware of deficits  Initiation: Requires cues for all  Sequencing: Requires cues for all    Objective  Strength RLE  Strength RLE: Exception  Comment: moderate generalized weakness  Strength LLE  Strength LLE: Exception  Comment: moderate generalized weakness  Motor Control  Gross Motor?: Exceptions  Comments: tremors - increase with movement     Bed mobility  Supine to Sit: Maximum assistance  Sit to Supine:  (pt in chair at end of session)  Transfers  Sit to Stand: Moderate Assistance;2 Person Assistance (mod A of 2 person to attempt to stand to walker - heavy posterior lean. Pt then required min A/mod A of one person to stand at stedy.)  Stand to sit: Minimal Assistance; Moderate Assistance (within stedy)  Bed to Chair: Dependent/Total (stedy)  Ambulation  Ambulation?: No     Balance  Posture: Fair  Sitting - Static: Fair;+  Sitting - Dynamic: Fair  Standing - Static: Poor        Plan   Plan  Times per week: 3-5x/week  Current Treatment Recommendations: Strengthening, Transfer Training, Balance Training, Functional Mobility Training, Neuromuscular Re-education, Patient/Caregiver Education & Training, Safety Education & Training, Gait Training  Safety Devices  Type of devices:  All fall risk precautions in place, Call light within reach, Gait belt, Patient at risk for falls, Left in chair, Chair alarm in place, Nurse notified    AM-PAC Score  AM-PAC Inpatient Mobility Raw Score : 9 (07/05/21 0916)  AM-PAC Inpatient T-Scale Score : 30.55 (07/05/21 0916)  Mobility Inpatient CMS 0-100% Score: 81.38 (07/05/21 0916)  Mobility Inpatient CMS G-Code Modifier : CM (07/05/21 6196)          Goals  Short term goals  Time Frame for Short term goals: by acute discharge  Short term goal 1: bed mobility with min A  Short term goal 2: sit<>stand with min A to RW  Short term goal 3: ambulate > 5' with RW and min A  Patient Goals   Patient goals : none stated       Therapy Time   Individual Concurrent Group Co-treatment   Time In  0900         Time Out  0940         Minutes  [address], PT

## 2021-07-05 NOTE — PROGRESS NOTES
Hospital Medicine Progress Note      Admit Date: 7/1/2021       CC: F/U for AMS    HPI: Pt admitted 7/1/21 with AMS. Bld Cx 2/2 + E. Faecalis. Echo 7/2/21- Grade II DD,Mitral valve prolapse of posterior leaflet with severe medially directed eccentric regurgitation     Interval History/Subjective: sitting up in chair.  at bedside. Seems pretty much back to her baseline cognitively per . Discussed plan for poss MANE tomorrow. Review of Systems:     The patient denied headaches, visual changes, LOC, SOB, CP, ABD pain, N/V/D, skin changes, new or worsening weakness or neuromuscular deficits. Comprehensive ROS negative except as mentioned above. Past Medical History:        Diagnosis Date    Dementia ([address]     Irregular heart beat     Mitral valve prolapse        Past Surgical History:        Procedure Laterality Date    BREAST BIOPSY Left     HAND SURGERY Left 6/11/2020    CLOSED REDUCTION LEFT SMALL FINGER DISLOCATION OF METACARPOPHALANGEAL JOINT performed by Caleb Chang MD at [facility] Left        Allergies:  Patient has no known allergies. Past medical and surgical history reviewed. Any changes have been noted. PHYSICAL EXAM:  BP (!) 143/82   Pulse 61   Temp 98 °F (36.7 °C) (Oral)   Resp 17   Ht 5' 3\" (1.6 m)   Wt 119 lb 4.3 oz (54.1 kg)   SpO2 94%   BMI 21.13 kg/m²       Intake/Output Summary (Last 24 hours) at 7/5/2021 0815  Last data filed at 7/4/2021 1844  Gross per 24 hour   Intake 240 ml   Output 800 ml   Net -560 ml        General appearance:   No apparent distress, appears stated age. Cooperative. HEENT:  Normocephalic, atraumatic. PERRLA. EOMi. Conjunctivae/corneas clear, no icterus, non-injected. Neck: Supple, with full range of motion. No jugular venous distention. Trachea midline. Respiratory:  Normal respiratory effort. Clear to auscultation, bilaterally without Rales/Wheezes/Rhonchi.   Cardiovascular:  Loud systolic murmur; Abdomen: Soft, non-tender, non-distended, without rebound or guarding. Normal bowel sounds. Musculoskeletal:  No clubbing, cyanosis or edema bilaterally. Full range of motion without deformity. Skin: Skin color, texture, turgor normal.  No rashes or lesions. Neurologic:  Neurovascularly intact without any focal sensory/motor deficits. Cranial nerves: II-XII intact, grossly intact. No facial asymmetry, tongue midline. Psychiatric:  Alert and oriented, thought content appropriate  Capillary Refill: Brisk,< 3 seconds   Peripheral Pulses: +2 palpable, equal bilaterally       LABS:    Lab Results   Component Value Date    WBC 4.2 07/03/2021    HGB 14.9 07/03/2021    HCT 43.1 07/03/2021    MCV 95.3 07/03/2021     07/03/2021    LYMPHOPCT 7.9 07/03/2021    RBC 4.53 07/03/2021    MCH 33.0 07/03/2021    MCHC 34.6 07/03/2021    RDW 13.2 07/03/2021       Lab Results   Component Value Date    CREATININE 0.8 07/03/2021    BUN 12 07/03/2021     07/03/2021    K 3.5 07/03/2021     07/03/2021    CO2 24 07/03/2021       Lab Results   Component Value Date    MG 1.80 07/03/2021       Lab Results   Component Value Date    ALT 13 07/01/2021    AST 20 07/01/2021    ALKPHOS 75 07/01/2021    BILITOT 0.5 07/01/2021        No flowsheet data found. No results found for: LABA1C    Imaging:  CT HEAD WO CONTRAST   Final Result   No acute intracranial abnormality. Diffuse atrophic changes with findings suggesting chronic microvascular   ischemia         XR CHEST PORTABLE   Final Result   Nonspecific subsegmental left basilar opacity.              Scheduled and prn Medications:    Scheduled Meds:   sodium chloride flush  5-40 mL Intravenous 2 times per day    potassium chloride  20 mEq Oral BID WC    ampicillin IV  2,000 mg Intravenous Q6H    latanoprost  1 drop Ophthalmic Nightly    metoprolol succinate  25 mg Oral BID    [Held by provider] donepezil  10 mg Oral Nightly    amiodarone  200 mg Oral Daily    apixaban  5 mg Oral BID     Continuous Infusions:   sodium chloride       PRN Meds:.sodium chloride flush, sodium chloride, ondansetron **OR** ondansetron, polyethylene glycol, acetaminophen **OR** acetaminophen, ALPRAZolam    Assessment & Plan:        Acute metabolic encephalopathy 2/2 to enterococcal bacteremia  -Enterococcus faecalis bacteremia 2/2--- unclear source though it is possible this is 2/2/ colonic neoplasm. Suspect  infective endocarditis with PM seeding-patient has severe MR  -Severe mitral valve prolapse and mitral regurgitation  -Paroxysmal A. Fib--rhythm paced--on Eliquis, amiodarone, metoprolol-stable  -Sick sinus syndrome status post permanent pacemaker in the past  -Alzheimer's dementia-on Aricept  -Essential hypertension-stable  -Hypokalemia-resolved     Plan:  -Empiric antibiotics--IV ampicillin for Enterococcus faecalis bacteremia with concern for endocarditis--awaiting for sensitivities. If VRE, will switch to vanc or linezolid   -? Need for MANE, cards consulted   -ID following, consult placed for cardiology(eval PM removal)  -Discussed with family at bedside  -Continue chronic medications    Continue current regimen/therapies. Monitor. Adjust medical regimen as appropriate. Body mass index is 21.13 kg/m². The patient and / or the family were informed of the results of any tests, a time was given to answer questions, a plan was proposed and they agreed with plan.       DVT ppx: eliquis      Diet: ADULT DIET; Easy to Chew    Consults:  IP CONSULT TO SOCIAL WORK  IP CONSULT TO INFECTIOUS DISEASES  IP CONSULT TO CARDIOLOGY    DISPO/placement plan: pending    Code Status: DNR-CCA      DIXIE Marrufo CNP  07/05/21

## 2021-07-05 NOTE — PROGRESS NOTES
Physical Therapy  Facility/Department: [address] MED SURG  Daily Treatment Note  NAME: Luis Johnston  : [id number]  MRN: [id number]    Date of Service: 2021    Discharge Recommendations:  Patient would benefit from continued therapy after discharge, 3-5 sessions per week     Luis Johnston scored a / on the AM-PAC short mobility form. Current research shows that an AM-PAC score of 17 or less is typically not associated with a discharge to the patient's home setting. Based on the patient's AM-PAC score and their current functional mobility deficits, it is recommended that the patient have 3-5 sessions per week of Physical Therapy at d/c to increase the patient's independence. Please see assessment section for further patient specific details. If patient discharges prior to next session this note will serve as a discharge summary. Please see below for the latest assessment towards goals. Assessment   Body structures, Functions, Activity limitations: Decreased functional mobility ; Decreased ADL status; Decreased strength;Decreased cognition;Decreased endurance;Decreased posture;Decreased balance  Assessment: The patient is a 80 y.o. female who [facility] to [facility] on 21 to be evaluated for some change in mental status, increased fatigue and increased tremor. PMHx: Dementia, pacemaker, mitral valve prolapse, glaucoma, aortic valve disease, sick sinus. Pt with unkown PLOF - lived with spouse. Pt currently requiring 2 person assist and LIFT equipment for mobility. Recommend continued skilled therapy 3-5x/week to maximize independence and safety with functional mobility. Treatment Diagnosis: impaired mobility  Prognosis: Fair;Poor  Decision Making: Medium Complexity  History: see below  Exam: see below  Clinical Presentation: evolving  PT Education: PT Role;Plan of Care;General Safety; Functional Mobility Training;Orientation  Barriers to Learning: cognition  REQUIRES PT FOLLOW UP: Yes  Activity Tolerance  Activity Tolerance: Patient limited by cognitive status     Patient Diagnosis(es): The encounter diagnosis was Altered mental status, unspecified altered mental status type. has a past medical history of Dementia ([address] Irregular heart beat, and Mitral valve prolapse.   has a past surgical history that includes Breast biopsy (Left); knee surgery (Left); and Hand surgery (Left, 6/11/2020). Restrictions  Restrictions/Precautions  Restrictions/Precautions: Fall Risk     Subjective   General  Chart Reviewed: Yes  Additional Pertinent Hx: The patient is a 80 y.o. female who presents to [facility] on 7/1/21 to be evaluated for some change in mental status, increased fatigue and increased tremor. PMHx: Dementia, pacemaker, mitral valve prolapse, glaucoma, aortic valve disease, sick sinus. Response To Previous Treatment: Not applicable  Family / Caregiver Present: No  Referring Practitioner: DIXIE Jhaveri CNP  Subjective  Subjective: Pt is agreeable to PT          Orientation  Orientation  Overall Orientation Status: Impaired (oriented to first name only)     Cognition   Cognition  Overall Cognitive Status: Exceptions  Arousal/Alertness: Delayed responses to stimuli  Following Commands: Inconsistently follows commands; Follows one step commands with repetition; Follows one step commands with increased time  Attention Span: Attends with cues to redirect  Memory: Decreased recall of recent events;Decreased short term memory  Safety Judgement: Decreased awareness of need for assistance;Decreased awareness of need for safety  Problem Solving: Decreased awareness of errors  Insights: Fully aware of deficits  Initiation: Requires cues for all  Sequencing: Requires cues for all     Objective   Bed mobility  Supine to Sit: Maximum assistance;2 Person assistance  Sit to Supine:  (pt in chair at end of session)  Transfers  Sit to Stand:  Moderate Assistance;2 Person Assistance (mod A of 2 person to attempt to stand to walker - heavy posterior lean. Pt then required min A/mod A of one person to stand at stedy.)  Stand to sit: Minimal Assistance; Moderate Assistance (within stedy)  Bed to Chair: Dependent/Total (stedy)  Ambulation  Ambulation?: No     Balance  Posture: Fair  Sitting - Static: Fair;+  Sitting - Dynamic: Fair  Standing - Static: Poor         Strength RLE  Strength RLE: Exception  Comment: moderate generalized weakness  Strength LLE  Strength LLE: Exception  Comment: moderate generalized weakness        AM-PAC Score  AM-PAC Inpatient Mobility Raw Score : 9 (07/05/21 1122)  AM-PAC Inpatient T-Scale Score : 30.55 (07/05/21 1122)  Mobility Inpatient CMS 0-100% Score: 81.38 (07/05/21 1122)  Mobility Inpatient CMS G-Code Modifier : CM (07/05/21 1122)          Goals  Short term goals  Time Frame for Short term goals: by acute discharge - all goals on going as of 7/5/21  Short term goal 1: bed mobility with min A  Short term goal 2: sit<>stand with min A to RW  Short term goal 3: ambulate > 5' with RW and min A  Patient Goals   Patient goals : none stated    Plan    Plan  Times per week: 3-5x/week  Current Treatment Recommendations: Strengthening, Transfer Training, Balance Training, Functional Mobility Training, Neuromuscular Re-education, Patient/Caregiver Education & Training, Safety Education & Training, Gait Training  Safety Devices  Type of devices:  All fall risk precautions in place, Call light within reach, Gait belt, Patient at risk for falls, Left in chair, Chair alarm in place, Nurse notified     Therapy Time   Individual Concurrent Group Co-treatment   Time In 1055         Time Out 1120         Minutes 25         Timed Code Treatment Minutes: 25 Minutes       Moe Rico, PT

## 2021-07-05 NOTE — PROGRESS NOTES
[address]   Daily Progress Note      Admit Date:  7/1/2021    Reason for initial consultation: Bacteremia with permanent pacemaker    CC: Patient with significant dementia and unable to provide any history. Interval History:  Pt with no acute overnight cardiac events. Patient has significant dementia. She is oriented to self only. Her  and son are present bedside and she is unable to name them. She typically follows with [facility] cardiology. She does not answer most questions but does not appear in any distress. The son feels that the patient seems more alert today. Past surgical history/social history/family history:   has a past surgical history that includes Breast biopsy (Left); knee surgery (Left); and Hand surgery (Left, 6/11/2020). reports that she has never smoked. She has never used smokeless tobacco. She reports that she does not drink alcohol and does not use drugs. family history is not on file. Review of Systems:   · Unable to obtain secondary to dementia. Objective:   BP (!) 143/82   Pulse 61   Temp 98 °F (36.7 °C) (Oral)   Resp 17   Ht 5' 3\" (1.6 m)   Wt 119 lb 4.3 oz (54.1 kg)   SpO2 94%   BMI 21.13 kg/m²       Intake/Output Summary (Last 24 hours) at 7/5/2021 0853  Last data filed at 7/4/2021 1844  Gross per 24 hour   Intake 240 ml   Output 800 ml   Net -560 ml     Wt Readings from Last 3 Encounters:   07/04/21 119 lb 4.3 oz (54.1 kg)   02/09/21 115 lb 11.9 oz (52.5 kg)   11/10/20 115 lb 15.4 oz (52.6 kg)       Physical Exam:  General:  NAD. Elderly. Frail. Eyes: Pupils equal and round. EOMI. No icterus. Skin:  Warm and dry. No new appearing rashes or lesions. Neck:  Supple. No JVP or bruit appreciated. Respiratory:  No respiratory distress. Clear to auscultation. No wheezes/rhonchi/rales  Cardiovascular:  Regular rate. III/VI holosystolic murmur. Abdomen:  Soft, nontender, +bowel sounds. MSK:  No LE edema. No clubbing or cyanosis. Moves all extremities. Psych: Awake, alert, and oriented X1. Medications:    sodium chloride flush  5-40 mL Intravenous 2 times per day    potassium chloride  20 mEq Oral BID WC    ampicillin IV  2,000 mg Intravenous Q6H    latanoprost  1 drop Ophthalmic Nightly    metoprolol succinate  25 mg Oral BID    [Held by provider] donepezil  10 mg Oral Nightly    amiodarone  200 mg Oral Daily    apixaban  5 mg Oral BID      sodium chloride       sodium chloride flush, sodium chloride, ondansetron **OR** ondansetron, polyethylene glycol, acetaminophen **OR** acetaminophen, ALPRAZolam    Lab Data:  CBC:   Recent Labs     07/03/21  0737   WBC 4.2   HGB 14.9        BMP:    Recent Labs     07/03/21  0737      K 3.5   CO2 24   BUN 12   CREATININE 0.8     LIVR: No results for input(s): AST, ALT in the last 72 hours. INR:  No results for input(s): INR in the last 72 hours. APTT: No results for input(s): APTT in the last 72 hours. BNP:  No results for input(s): BNP in the last 72 hours. ECG: Personally interpreted. 7/1/2021. Sinus rhythm with first-degree AV block. Nonspecific IVCD. Echo: 7/2/21  Dilated LV with normal wall motion and EF of 50%, Grade II diastolic dysfunction with elevated LV filling pressures. Mild aortic regurgitation. Thickening of leaflets of mitral valve. Mitral valve prolapse of posterior leaflet with severe medially directed eccentric regurgitation. Mildly dilated right ventricle. Right ventricular systolic function is normal.  Mild tricuspid regurgitation. The right atrium is normal in size. Pacemaker / ICD lead is visualized in the right atrium. Assessment/Plan:    1) Bacteremia. Will defer antibiotics to the primary team and infectious disease consultant. With patient's comorbidities including severe dementia would not pursue lead extraction as the patient is pacemaker dependent unless the infection is unable to be cleared.     2) Mitral valve prolapse/severe mitral regurgitation. Known issue. Her outpatient cardiology note states she is not a candidate for repair or replacement with her comorbidities. Patient appears compensated and seemingly asymptomatic. Comfort based therapies seems most appropriate. 3) Sick sinus syndrome s/p permanent pacemaker. Patient is reported as being pacemaker dependent on last interrogation 4/16/2021. The device is functioning normally. Would not pursue lead extraction in patient with such significant dementia assuming repeat blood cultures with no growth. 4) Paroxysmal atrial fibrillation. Continue attempts at rhythm control with amiodarone. Patient on DOAC but adjust dosing based on age and weight to Eliquis 2.5 mg twice daily. 5) Dementia. Outpatient cardiology note states that she is not a candidate for mitral valve repair secondary to her severe dementia and frailty. Overall, the problems requiring hospitalization are high in severity.      Electronically signed by Luis Alfredo Ortiz MD on 7/5/2021 at 8:53 AM

## 2021-07-05 NOTE — PROGRESS NOTES
Pt awake,answers questions. Oriented to self only. Took meds crushed in applesauce. Pur wick in place. Pt repositioned to lt. Call light in reach.

## 2021-07-06 LAB
AMIODARONE LEVEL: 0.7 UG/ML (ref 0.5–2)
DES-AMIOD: 0.7 UG/ML

## 2021-07-06 PROCEDURE — 6360000002 HC RX W HCPCS: Performed by: HOSPITALIST

## 2021-07-06 PROCEDURE — 6370000000 HC RX 637 (ALT 250 FOR IP): Performed by: INTERNAL MEDICINE

## 2021-07-06 PROCEDURE — 94760 N-INVAS EAR/PLS OXIMETRY 1: CPT

## 2021-07-06 PROCEDURE — 99233 SBSQ HOSP IP/OBS HIGH 50: CPT | Performed by: INTERNAL MEDICINE

## 2021-07-06 PROCEDURE — 6370000000 HC RX 637 (ALT 250 FOR IP): Performed by: NURSE PRACTITIONER

## 2021-07-06 PROCEDURE — 97530 THERAPEUTIC ACTIVITIES: CPT

## 2021-07-06 PROCEDURE — 99232 SBSQ HOSP IP/OBS MODERATE 35: CPT | Performed by: INTERNAL MEDICINE

## 2021-07-06 PROCEDURE — 2580000003 HC RX 258: Performed by: HOSPITALIST

## 2021-07-06 PROCEDURE — 36415 COLL VENOUS BLD VENIPUNCTURE: CPT

## 2021-07-06 PROCEDURE — 2580000003 HC RX 258: Performed by: NURSE PRACTITIONER

## 2021-07-06 PROCEDURE — 1200000000 HC SEMI PRIVATE

## 2021-07-06 PROCEDURE — 87040 BLOOD CULTURE FOR BACTERIA: CPT

## 2021-07-06 RX ADMIN — AMPICILLIN SODIUM 2000 MG: 2 INJECTION, POWDER, FOR SOLUTION INTRAMUSCULAR; INTRAVENOUS at 11:16

## 2021-07-06 RX ADMIN — POTASSIUM BICARBONATE 20 MEQ: 782 TABLET, EFFERVESCENT ORAL at 21:08

## 2021-07-06 RX ADMIN — ACETAMINOPHEN 650 MG: 325 TABLET ORAL at 21:08

## 2021-07-06 RX ADMIN — APIXABAN 2.5 MG: 2.5 TABLET, FILM COATED ORAL at 09:39

## 2021-07-06 RX ADMIN — METOPROLOL SUCCINATE 25 MG: 25 TABLET, FILM COATED, EXTENDED RELEASE ORAL at 21:08

## 2021-07-06 RX ADMIN — Medication 10 ML: at 21:09

## 2021-07-06 RX ADMIN — AMIODARONE HYDROCHLORIDE 200 MG: 200 TABLET ORAL at 09:39

## 2021-07-06 RX ADMIN — APIXABAN 2.5 MG: 2.5 TABLET, FILM COATED ORAL at 21:08

## 2021-07-06 RX ADMIN — Medication 10 ML: at 09:42

## 2021-07-06 RX ADMIN — METOPROLOL SUCCINATE 25 MG: 25 TABLET, FILM COATED, EXTENDED RELEASE ORAL at 09:39

## 2021-07-06 RX ADMIN — AMPICILLIN SODIUM 2000 MG: 2 INJECTION, POWDER, FOR SOLUTION INTRAMUSCULAR; INTRAVENOUS at 21:20

## 2021-07-06 RX ADMIN — AMPICILLIN SODIUM 2000 MG: 2 INJECTION, POWDER, FOR SOLUTION INTRAMUSCULAR; INTRAVENOUS at 16:02

## 2021-07-06 RX ADMIN — AMPICILLIN SODIUM 2000 MG: 2 INJECTION, POWDER, FOR SOLUTION INTRAMUSCULAR; INTRAVENOUS at 03:51

## 2021-07-06 RX ADMIN — LATANOPROST 1 DROP: 50 SOLUTION OPHTHALMIC at 21:08

## 2021-07-06 RX ADMIN — POTASSIUM CHLORIDE 20 MEQ: 20 TABLET, EXTENDED RELEASE ORAL at 09:39

## 2021-07-06 ASSESSMENT — PAIN SCALES - WONG BAKER
WONGBAKER_NUMERICALRESPONSE: 0

## 2021-07-06 ASSESSMENT — PAIN SCALES - GENERAL
PAINLEVEL_OUTOF10: 0
PAINLEVEL_OUTOF10: 2
PAINLEVEL_OUTOF10: 0
PAINLEVEL_OUTOF10: 0

## 2021-07-06 NOTE — PROGRESS NOTES
Physician Progress Note      PATIENT:               Sharath Dunbar  CSN #:                  [id number]  :                       1940  ADMIT DATE:       2021 6:46 PM  [facility] DATE:  RESPONDING  PROVIDER #:        Yanely Newsome CNP          QUERY TEXT:    Pt with PPM admitted with lethargy and increasing tremor. Found to have   Enterococcal bacteremia with metabolic encephalopathy. Per ID consult note:    Assume seeded PM.  If possible, please document in the progress notes and   discharge summary:    The medical record reflects the following:  Risk Factors: PPM  Clinical Indicators: Enterococcal bacteremia, metabolic encephalopathy,   Procalcitonin 0.29, T 100.6 on arrival, WBC 4.7  Treatment: ID consult, cardiology consult, EP consult, Ampicillin    Thank you,  Camilo Marcus RN, BSN, CCDS  [email or web address]  Options provided:  -- Bacteremia due to infected PPM  -- Bacteremia unrelated to infected PPM  -- Other - I will add my own diagnosis  -- Disagree - Not applicable / Not valid  -- Disagree - Clinically unable to determine / Unknown  -- Refer to Clinical Documentation Reviewer    PROVIDER RESPONSE TEXT:    Provider is clinically unable to determine a response to this query.     Query created by: Jared Garcia on 2021 9:24 AM      Electronically signed by:  Yanely Newsome CNP 2021 4:15 PM

## 2021-07-06 NOTE — PROGRESS NOTES
Cardiac Electrophysiology Progress Note     Admit Date: 2021     Reason for follow up: enterococcus faecalis septicemia - management of Medtronic 2-ch PPM    HPI and Interval History:   Patient seen and examined. Clinical notes reviewed. Telemetry reviewed - A paced, V sensed with v-rates 70's bpm . No new complaint today. Patient is oriented to self but not place or time. No major events overnight. Denies having angina, shortness of breath, dyspnea on exertion, Orthopnea, PND at the time of this visit. Review of System:  All other systems reviewed except for that noted above. Pertinent negatives and positives are:     · General: negative for fever, chills   · Ophthalmic ROS: negative for - eye pain or loss of vision  · ENT ROS: negative for - headaches, sore throat   · Respiratory: negative for - cough, sputum  · Cardiovascular: Reviewed in HPI  · Gastrointestinal: negative for - abdominal pain, diarrhea, N/V  · Hematology: negative for - bleeding, blood clots, bruising or jaundice  · Genito-Urinary:  negative for - Dysuria or incontinence  · Musculoskeletal: negative for - Joint swelling, muscle pain  · Neurological: negative for - confusion, dizziness, headaches   · Psychiatric: No anxiety, no depression.   · Dermatological: negative for - rash      Physical Examination:  Vitals:    21 0930   BP: (!) 144/74   Pulse: 62   Resp: 16   Temp: 98.7 °F (37.1 °C)   SpO2: 93%        Intake/Output Summary (Last 24 hours) at 2021 1249  Last data filed at 2021 1000  Gross per 24 hour   Intake 340 ml   Output 800 ml   Net -460 ml     In: 340 [P.O.:340]  Out: 800    Wt Readings from Last 3 Encounters:   21 119 lb 4.3 oz (54.1 kg)   21 115 lb 11.9 oz (52.5 kg)   11/10/20 115 lb 15.4 oz (52.6 kg)     Temp  Av.3 °F (36.8 °C)  Min: 98.1 °F (36.7 °C)  Max: 98.7 °F (37.1 °C)  Pulse  Av  Min: 62  Max: 84  BP  Min: 120/67  Max: 144/74  SpO2  Av.6 %  Min: 92 %  Max: 95 latanoprost  1 drop Ophthalmic Nightly    metoprolol succinate  25 mg Oral BID    [Held by provider] donepezil  10 mg Oral Nightly    amiodarone  200 mg Oral Daily     Continuous Infusions:   sodium chloride       PRN Meds:sodium chloride flush, sodium chloride, ondansetron **OR** ondansetron, polyethylene glycol, acetaminophen **OR** acetaminophen, ALPRAZolam     Patient Active Problem List    Diagnosis Date Noted    Severe mitral regurgitation     Mitral valve prolapse     SSS (sick sinus syndrome) (HCC)     PAF (paroxysmal atrial fibrillation) (HCC)     Dementia without behavioral disturbance ([address]     Pacemaker electrode infection ([facility] 07/03/2021    Bacteremia 07/02/2021    Status cardiac pacemaker 07/02/2021    Late onset Alzheimer's dementia without behavioral disturbance ([address] 07/02/2021    AMS (altered mental status) 07/01/2021      Active Hospital Problems    Diagnosis Date Noted    Severe mitral regurgitation [I34.0]     Mitral valve prolapse [I34.1]     SSS (sick sinus syndrome) ([facility]) [I49.5]     PAF (paroxysmal atrial fibrillation) ([facility]) [I48.0]     Dementia without behavioral disturbance ([facility] [F03.90]     Pacemaker electrode infection ([facility] [X57. 7XXA] 07/03/2021    Bacteremia [R78.81] 07/02/2021    Status cardiac pacemaker [Z95.0] 07/02/2021    Late onset Alzheimer's dementia without behavioral disturbance ([address] [G30.1, F02.80] 07/02/2021    AMS (altered mental status) [R41.82] 07/01/2021       Assessment and Plan:     Enterococcus faecalis septicemia  -2 of 2 bottles were positive with bacteremia on day of admission.  -according to HRS guidelines, she meets a class I indication for PPM and leads (both RA and RV) extraction. However, the patient has precluding comorbidities, including dementia (oriented to self but not time nor place), frailty (possibly cachexia).    -held a family meeting today between myself, [place] (son; [phone]), Nacho Vargas (daughter [phone] (cell) and  (work)), [place] Niko (; also showing signs of dementia and not able to make appropriate medical decisions for wife). Options were laid out that the patient may undergo MANE, followed by laser lead extraction (by Dr. Gayatri Quiles, [facility]) with CT surgery backup. Or the patient may continue to be monitored for worsening infection (new fever, leukocytosis, positive subsequent blood cultures) while continuing IV Abx. The son and daughter (who functions as the POA) have agreed to have the patient monitored for now, without undergoing either the MANE or the leads extraciton and generator removal.   -of note, Medtronic 2ch PPM implanted Feb 2020 for sick sinus syndrome - A paced 81% of the time, V-paced 9% of the time. Implanted by Dr. Sam Alonzo ([address]). The patient is not pacer dependent.  -would recommend another Blood Cx to be drawn today to evaluate for any continued septicemia. Will follow from EP perspective from [facility]. Thank you for allowing me to participate in the care of this patient. If you have any questions, please do not hesitate to contact me.     Cristhian Tovar MD, MS, [facility]  Cardiac Electrophysiology  [address]  [address] [facility], [address] [place] [address] Juanjose Fermin [address]  [phone]

## 2021-07-06 NOTE — PROGRESS NOTES
Hospital Medicine Progress Note      Admit Date: 7/1/2021       CC: F/U for AMS    HPI: Pt admitted 7/1/21 with AMS. Bld Cx 2/2 + E. Faecalis. Echo 7/2/21- Grade II DD,Mitral valve prolapse of posterior leaflet with severe medially directed eccentric regurgitation      7/5: sitting up in chair.  at bedside. Seems pretty much back to her baseline cognitively per . Discussed plan for poss MANE tomorrow. Interval History/Subjective: cont IV antibx. Repeat blood culture drawn today    Doing well. Looks better today than when I saw her  Yesterday with her son. No family is present at this time. Sitting up in chair doing \"good. \" no new complaints. Review of Systems:     The patient denied headaches, visual changes, LOC, SOB, CP, ABD pain, N/V/D, skin changes, new or worsening weakness or neuromuscular deficits. Comprehensive ROS negative except as mentioned above. Past Medical History:        Diagnosis Date    Dementia ([address]     Irregular heart beat     Mitral valve prolapse        Past Surgical History:        Procedure Laterality Date    BREAST BIOPSY Left     HAND SURGERY Left 6/11/2020    CLOSED REDUCTION LEFT SMALL FINGER DISLOCATION OF METACARPOPHALANGEAL JOINT performed by Eric Reid MD at . [place] [id number] Left        Allergies:  Patient has no known allergies. Past medical and surgical history reviewed. Any changes have been noted. PHYSICAL EXAM:  BP (!) 144/74   Pulse 62   Temp 98.7 °F (37.1 °C) (Oral)   Resp 16   Ht 5' 3\" (1.6 m)   Wt 119 lb 4.3 oz (54.1 kg)   SpO2 93%   BMI 21.13 kg/m²       Intake/Output Summary (Last 24 hours) at 7/6/2021 0958  Last data filed at 7/6/2021 8123  Gross per 24 hour   Intake 340 ml   Output 800 ml   Net -460 ml        General appearance:   No apparent distress, appears stated age. Cooperative. HEENT:  Normocephalic, atraumatic. PERRLA. EOMi. Conjunctivae/corneas clear, no icterus, non-injected.   Neck: Supple, with full range of motion. No jugular venous distention. Trachea midline. Respiratory:  Normal respiratory effort. Clear to auscultation, bilaterally without Rales/Wheezes/Rhonchi. Cardiovascular: loud systolic murmur   No rubs or gallops. Abdomen: Soft, non-tender, non-distended, without rebound or guarding. Normal bowel sounds. Musculoskeletal:  No clubbing, cyanosis or edema bilaterally. Full range of motion without deformity. Skin: Skin color, texture, turgor normal.  No rashes or lesions. Neurologic:  Neurovascularly intact without any focal sensory/motor deficits. Cranial nerves: II-XII intact, grossly intact. No facial asymmetry, tongue midline. Psychiatric:  Alert and oriented, thought content appropriate  Capillary Refill: Brisk,< 3 seconds   Peripheral Pulses: +2 palpable, equal bilaterally       LABS:    Lab Results   Component Value Date    WBC 4.2 07/03/2021    HGB 14.9 07/03/2021    HCT 43.1 07/03/2021    MCV 95.3 07/03/2021     07/03/2021    LYMPHOPCT 7.9 07/03/2021    RBC 4.53 07/03/2021    MCH 33.0 07/03/2021    MCHC 34.6 07/03/2021    RDW 13.2 07/03/2021       Lab Results   Component Value Date    CREATININE 0.8 07/03/2021    BUN 12 07/03/2021     07/03/2021    K 3.5 07/03/2021     07/03/2021    CO2 24 07/03/2021       Lab Results   Component Value Date    MG 1.80 07/03/2021       Lab Results   Component Value Date    ALT 13 07/01/2021    AST 20 07/01/2021    ALKPHOS 75 07/01/2021    BILITOT 0.5 07/01/2021        No flowsheet data found. No results found for: LABA1C    Imaging:  CT HEAD WO CONTRAST   Final Result   No acute intracranial abnormality. Diffuse atrophic changes with findings suggesting chronic microvascular   ischemia         XR CHEST PORTABLE   Final Result   Nonspecific subsegmental left basilar opacity.              Scheduled and prn Medications:    Scheduled Meds:   apixaban  2.5 mg Oral BID    sodium chloride flush  5-40 mL Intravenous 2 times per day    potassium chloride  20 mEq Oral BID WC    ampicillin IV  2,000 mg Intravenous Q6H    latanoprost  1 drop Ophthalmic Nightly    metoprolol succinate  25 mg Oral BID    [Held by provider] donepezil  10 mg Oral Nightly    amiodarone  200 mg Oral Daily     Continuous Infusions:   sodium chloride       PRN Meds:.sodium chloride flush, sodium chloride, ondansetron **OR** ondansetron, polyethylene glycol, acetaminophen **OR** acetaminophen, ALPRAZolam    Assessment & Plan:        Acute metabolic encephalopathy 2/2 to enterococcal bacteremia  -Enterococcus faecalis bacteremia 2/2--- unclear source though it is possible this is 2/2/ colonic neoplasm. Suspect  infective endocarditis with PM seeding-patient has severe MR  -Severe mitral valve prolapse and mitral regurgitation  -Paroxysmal A. Fib and SSS--rhythm paced--on Eliquis, amiodarone, metoprolol-stable  - last interrogation 4/16/21  -Sick sinus syndrome status post permanent pacemaker in the past  -Alzheimer's dementia-on Aricept  -Essential hypertension-stable  -Hypokalemia-resolved     Plan:  -Empiric antibiotics--IV ampicillin for Enterococcus faecalis bacteremia with concern for endocarditis--awaiting for sensitivities. If VRE, will switch to vanc or linezolid   -? Need for MANE, cards consulted   - has known mitral valve prolapse/severe mitral regurg.   - outpatient note states she is not candidate for repair or replace w/cormorbidites. -ID following, consult placed for cardiology(eval PM removal)  -Discussed with family at bedside  -Continue chronic medications  - per cardiology would not persue lead extraction as the pt is pacemaker dependent unless infection unable to be cleared  - follow repeat blood cultures (drawn 7/6)    Continue current regimen/therapies. Monitor. Adjust medical regimen as appropriate. Body mass index is 21.13 kg/m².     The patient and / or the family were informed of the results of any tests, a time was given to answer questions, a plan was proposed and they agreed with plan.       DVT ppx: eliquis      Diet: ADULT DIET; Easy to Chew    Consults:  IP CONSULT TO SOCIAL WORK  IP CONSULT TO INFECTIOUS DISEASES  IP CONSULT TO CARDIOLOGY    DISPO/placement plan: pending    Code Status: DNR-CCA      DIXIE David CNP  07/06/21

## 2021-07-06 NOTE — PROGRESS NOTES
Request from Dr Corrina dEdy to find out who healthcare POA and primary decision maker is for pt. Call placed to Rakesh Knight ( daughter) message left for call back. Call placed to Yaneli Fernandez ( son), stated that he and his sister split the decision making and will bring paperwork in later this afternoon. Dr Corrina Eddy notified. Electronically signed by Maryjo Howell RN on 7/6/2021 at 11:53 AM    Son Yaneli Fernandez brought [address] and Living Will paperwork, copied and placed in chart.  Electronically signed by Maryjo Howell RN on 7/6/2021 at 11:54 AM

## 2021-07-06 NOTE — CARE COORDINATION
Spoke with son, Pepito Nieves, on the phone. Referral sent to [facility] per their request after discussing therapy's recommendations. Electronically signed by Fer Whitt RN on 7/6/21 at 12:45 PM EDT    [facility] able to accept at [facility].     Electronically signed by Fer Whitt RN on 7/6/21 at 1:56 PM EDT

## 2021-07-06 NOTE — PLAN OF CARE
Problem: Falls - Risk of:  Goal: Will remain free from falls  Description: Will remain free from falls  7/6/2021 1204 by Bladimir Hayes RN  Outcome: Ongoing  Note: Fall precautions in place. Bed in lowest position, wheels locked, call light in reach, non slip socks on, alarm armed. Call light in reach. Problem: Skin Integrity:  Goal: Will show no infection signs and symptoms  Description: Will show no infection signs and symptoms  7/6/2021 1204 by Bladimir Hayes RN  Note: Skin assessment per shift. Pt educated on turning and repositioning every two hours. Pillow support offered.

## 2021-07-06 NOTE — PLAN OF CARE
Problem: Falls - Risk of:  Goal: Will remain free from falls  Description: Will remain free from falls  7/5/2021 2238 by Meri Goodell, RN  Outcome: Ongoing  7/5/2021 1043 by Juliana Mendoza RN  Outcome: Ongoing  Goal: Absence of physical injury  Description: Absence of physical injury  7/5/2021 2238 by Meri Goodell, RN  Outcome: Ongoing  7/5/2021 1043 by Juliana Mendoza RN  Outcome: Ongoing     Problem: Skin Integrity:  Goal: Will show no infection signs and symptoms  Description: Will show no infection signs and symptoms  7/5/2021 2238 by Meri Goodell, RN  Outcome: Ongoing  7/5/2021 1043 by Juliana Mendoza RN  Outcome: Ongoing  Goal: Absence of new skin breakdown  Description: Absence of new skin breakdown  7/5/2021 2238 by Meri Goodell, RN  Outcome: Ongoing  7/5/2021 1043 by Juliana Mendoza RN  Outcome: Ongoing     Problem: Confusion - Acute:  Goal: Absence of continued neurological deterioration signs and symptoms  Description: Absence of continued neurological deterioration signs and symptoms  7/5/2021 2238 by Meri Goodell, RN  Outcome: Ongoing  7/5/2021 1043 by Juliana Mendoza RN  Outcome: Ongoing  Goal: Mental status will be restored to baseline  Description: Mental status will be restored to baseline  7/5/2021 2238 by Meri Goodell, RN  Outcome: Ongoing  7/5/2021 1043 by Juliana Mendoza RN  Outcome: Ongoing     Problem: Discharge Planning:  Goal: Ability to perform activities of daily living will improve  Description: Ability to perform activities of daily living will improve  7/5/2021 2238 by Meri Goodell, RN  Outcome: Ongoing  7/5/2021 1043 by Juliana Mendoza RN  Outcome: Ongoing  Goal: Participates in care planning  Description: Participates in care planning  7/5/2021 2238 by Meri Goodell, RN  Outcome: Ongoing  7/5/2021 1043 by Juliana Mendoza RN  Outcome: Ongoing     Problem: Injury - Risk of, Physical Injury:  Goal: Will remain free from falls  Description: Will remain free from falls  7/5/2021 2238 by Joe Hurtado RN  Outcome: Ongoing  7/5/2021 1043 by Radha Mercedes RN  Outcome: Ongoing  Goal: Absence of physical injury  Description: Absence of physical injury  7/5/2021 2238 by Joe Hurtado RN  Outcome: Ongoing  7/5/2021 1043 by Radha Mercedes RN  Outcome: Ongoing     Problem: Mood - Altered:  Goal: Mood stable  Description: Mood stable  7/5/2021 2238 by Joe Hurtado RN  Outcome: Ongoing  7/5/2021 1043 by Radha Mercedes RN  Outcome: Ongoing  Goal: Absence of abusive behavior  Description: Absence of abusive behavior  7/5/2021 2238 by Joe Hurtado RN  Outcome: Ongoing  7/5/2021 1043 by Radha Mercedes RN  Outcome: Ongoing  Goal: Verbalizations of feeling emotionally comfortable while being cared for will increase  Description: Verbalizations of feeling emotionally comfortable while being cared for will increase  7/5/2021 2238 by Joe Hurtado RN  Outcome: Ongoing  7/5/2021 1043 by Radha Mercedes RN  Outcome: Ongoing     Problem: Psychomotor Activity - Altered:  Goal: Absence of psychomotor disturbance signs and symptoms  Description: Absence of psychomotor disturbance signs and symptoms  7/5/2021 2238 by Joe Hurtado RN  Outcome: Ongoing  7/5/2021 1043 by Radha Mercedes RN  Outcome: Ongoing     Problem: Sensory Perception - Impaired:  Goal: Demonstrations of improved sensory functioning will increase  Description: Demonstrations of improved sensory functioning will increase  7/5/2021 2238 by Joe Hurtado RN  Outcome: Ongoing  7/5/2021 1043 by Radha Mercedes RN  Outcome: Ongoing  Goal: Decrease in sensory misperception frequency  Description: Decrease in sensory misperception frequency  7/5/2021 2238 by Joe Hurtado RN  Outcome: Ongoing  7/5/2021 1043 by Radha Mercedes RN  Outcome: Ongoing  Goal: Able to refrain from responding to false sensory perceptions  Description: Able to refrain from responding to false sensory perceptions  7/5/2021 2238 by Joe Hurtado RN  Outcome: Ongoing  7/5/2021 1043 by Radha Mercedes RN  Outcome: Ongoing  Goal: Demonstrates accurate environmental perceptions  Description: Demonstrates accurate environmental perceptions  7/5/2021 2238 by Joe Hurtado RN  Outcome: Ongoing  7/5/2021 1043 by Radha Mercedes RN  Outcome: Ongoing  Goal: Able to distinguish between reality-based and nonreality-based thinking  Description: Able to distinguish between reality-based and nonreality-based thinking  7/5/2021 2238 by Joe Hurtado RN  Outcome: Ongoing  7/5/2021 1043 by Radha Mercedes RN  Outcome: Ongoing  Goal: Able to interrupt nonreality-based thinking  Description: Able to interrupt nonreality-based thinking  7/5/2021 2238 by Joe Hurtado RN  Outcome: Ongoing  7/5/2021 1043 by Radha Mercedes RN  Outcome: Ongoing     Problem: Sleep Pattern Disturbance:  Goal: Appears well-rested  Description: Appears well-rested  7/5/2021 2238 by Joe Hurtado RN  Outcome: Ongoing  7/5/2021 1043 by Radha Mercedes RN  Outcome: Ongoing     Problem: Skin Integrity:  Goal: Will show no infection signs and symptoms  Description: Will show no infection signs and symptoms  7/5/2021 2238 by Joe Hurtado RN  Outcome: Ongoing  7/5/2021 1043 by Radha Mercedes RN  Outcome: Ongoing     Problem: Confusion - Acute:  Goal: Absence of continued neurological deterioration signs and symptoms  Description: Absence of continued neurological deterioration signs and symptoms  7/5/2021 2238 by Joe Hurtado RN  Outcome: Ongoing  7/5/2021 1043 by Radha Mercedes RN  Outcome: Ongoing     Problem: Discharge Planning:  Goal: Ability to perform activities of daily living will improve  Description: Ability to perform activities of daily living will improve  7/5/2021 2238 by Joe Hurtado RN  Outcome: Ongoing  7/5/2021 1043 by Radha Mercedes RN  Outcome: Ongoing

## 2021-07-06 NOTE — PROGRESS NOTES
Occupational Therapy  Facility/Department: [address] MED SURG  Daily Treatment Note  NAME: Thomas Martinez  :   MRN: [id number]    Date of Service: 2021    Discharge Recommendations:  Patient would benefit from continued therapy after discharge, 3-5 sessions per week      Thomas Martinez scored a 9/24 on the AM-PAC ADL Inpatient form. Current research shows that an AM-PAC score of 17 or less is typically not associated with a discharge to the patient's home setting. Based on the patient's AM-PAC score and their current ADL deficits, it is recommended that the patient have 3-5 sessions per week of Occupational Therapy at d/c to increase the patient's independence. Please see assessment section for further patient specific details. If patient discharges prior to next session this note will serve as a discharge summary. Please see below for the latest assessment towards goals. Assessment   Performance deficits / Impairments: Decreased functional mobility ; Decreased endurance;Decreased strength;Decreased safe awareness;Decreased high-level IADLs;Decreased ADL status; Decreased balance;Decreased cognition;Decreased coordination  Assessment: Pt continues to function below baseline, limited in self-care by B UE tremors, generalized weakness, decreased balance/fxl mobility, and cognitive deficits. This date, pt required assist x2 for bed mobility and fxl transfer using lift equipment (víctor stedy), max A/total A feeding, and anticipate pt would require overall max A/total A for ADLs. Pt pleasantly confused, but able to follow simple commands to participate with increase time/repetition. At current status, AM-PAC score is NOT associated with a homebound d/c and indicates need for ongoing skilled OT to maximize pt's safety, independence, and to decrease caregiver burden. Will cont to follow while hopitalized. Prognosis: Fair  OT Education: OT Role;Plan of Care;Transfer Training;Orientation; Family Education  Barriers to Learning: cognition  REQUIRES OT FOLLOW UP: Yes  Activity Tolerance  Activity Tolerance: Treatment limited secondary to decreased cognition;Patient limited by fatigue  Safety Devices  Safety Devices in place: Yes  Type of devices: Call light within reach; Chair alarm in place; Left in chair;Gait belt;Nurse notified         Patient Diagnosis(es): The encounter diagnosis was Altered mental status, unspecified altered mental status type. has a past medical history of Dementia ([address] Irregular heart beat, and Mitral valve prolapse.   has a past surgical history that includes Breast biopsy (Left); knee surgery (Left); and Hand surgery (Left, 6/11/2020). Restrictions  Restrictions/Precautions  Restrictions/Precautions: Fall Risk  Subjective   General  Chart Reviewed: Yes  Patient assessed for rehabilitation services?: Yes  Additional Pertinent Hx: 79 y/o female admitted 7/1/2021 with AMS, fatigue, increased tremor. Covid negative. CT of her head is negative for ICH. urine drug screen is positive for benzodiazepines. Pt with history of dementia  Family / Caregiver Present: Yes (, son)  Referring Practitioner: DIXIE Yin CNP  Diagnosis: AMS, fatigue, tremors  Subjective  Subjective: Pt met b/s for OT cotx with PT. Pt in bed on arrival, agreeable to participate in therapy. Pt pleasantly confused. Pt denies pain.      Social/Functional History  Social/Functional History  Lives With: Spouse, Son  Type of Home: House  Home Layout: One level, Laundry in basement  Home Access: Level entry  Bathroom Shower/Tub: Tub/Shower unit, Shower chair with back  [employer]: Handicap height  Bathroom Equipment: Grab bars in [facility]: Rolling walker, Cane  Receives Help From: Family  ADL Assistance: Needs assistance (needs assist with lower body dressing; intermittent bathing)  Homemaking Responsibilities: No  Ambulation Assistance: Independent (No DME)  Transfer Assistance: Independent  Active : No  Occupation: Retired  Additional Comments: Pt with AMS and unable to provide social history. Per chart, pt has had increased difficulty ambulating recently due to AMS and increased tremors. Above information obtained from son present at bedside. Objective      ADL  Feeding: Dependent/Total;Maximum assistance (pt assisted with beverage management but had difficulty grasping cup and with impaired coordination d/t tremors requiring Koi and max A, total A for food items)  Toileting: Dependent/Total (external purewick catheter)     Balance  Sitting Balance: Contact guard assistance (seated EOB with min A initially progressing to CGA)  Standing Balance: Maximum assistance (min A in víctor stedy, max A x2 without stedy, very unsteady with posterior lean and required B LE's to be blocked from sliding)  Functional Mobility  Assist Level: Dependent/Total  Functional Mobility Comments: total A via víctor stedy for bed-chair    Bed mobility  Supine to Sit: Maximum assistance;2 Person assistance  Sit to Supine: Unable to assess (pt in chair at end of session)  Scooting: Moderate assistance;Maximal assistance     Transfers  Sit to stand: Moderate assistance;2 Person assistance (EOB>víctor stedy)  Stand to sit: Minimal assistance;2 Person assistance (víctor stedy>recliner)  Transfer Comments: total A via víctor stedy for bed-chair     Cognition  Overall Cognitive Status: Exceptions  Arousal/Alertness: Delayed responses to stimuli  Following Commands: Follows one step commands with repetition; Follows one step commands with increased time  Attention Span: Attends with cues to redirect  Memory: Decreased recall of recent events;Decreased short term memory  Safety Judgement: Decreased awareness of need for safety  Problem Solving: Decreased awareness of errors;Assistance required to identify errors made;Assistance required to generate solutions  Insights: Decreased awareness of deficits  Initiation: Requires cues for all  Sequencing: Requires cues for all      Plan   Plan  Times per week: 3-5  Current Treatment Recommendations: Strengthening, Functional Mobility Training, Gait Training, Endurance Training, Balance Training, Self-Care / ADL, Cognitive Reorientation           AM-PAC Score        AM-PAC Inpatient Daily Activity Raw Score: 9 (07/06/21 1135)  AM-PAC Inpatient ADL T-Scale Score : 25.33 (07/06/21 1135)  ADL Inpatient CMS 0-100% Score: 79.59 (07/06/21 1135)  ADL Inpatient CMS G-Code Modifier : CL (07/06/21 1135)    Goals  Short term goals  Time Frame for Short term goals: Prior to DC: status of goals ongoing as of 7/6/21  Short term goal 1: Pt will complete ADL transfers with min A  Short term goal 2: Pt will tolerate standing > 3 min for functional task with CGA  Short term goal 3: Pt will complete functional mobility with min A  Short term goal 4: Pt will complete toileting with min A  Patient Goals   Patient goals : no goal stated 2/2 cognition       Therapy Time   Individual Concurrent Group Co-treatment   Time In 1056         Time Out 1123         Minutes 1360 Darlene Tinsley, OTR/L [id number]

## 2021-07-06 NOTE — PROGRESS NOTES
Physical Therapy  Facility/Department: [address] MED SURG  Daily Treatment Note  NAME: Raúl Medrano  : [id number]  MRN: [id number]    Date of Service: 2021    Discharge Recommendations:  Patient would benefit from continued therapy after discharge, 3-5 sessions per week     Raúl Medrano scored a 9/24 on the AM-PAC short mobility form. Current research shows that an AM-PAC score of 17 or less is typically not associated with a discharge to the patient's home setting. Based on the patient's AM-PAC score and their current functional mobility deficits, it is recommended that the patient have 3-5 sessions per week of Physical Therapy at d/c to increase the patient's independence. Please see assessment section for further patient specific details. If patient discharges prior to next session this note will serve as a discharge summary. Please see below for the latest assessment towards goals. Assessment   Body structures, Functions, Activity limitations: Decreased functional mobility ; Decreased ADL status; Decreased strength;Decreased cognition;Decreased endurance;Decreased posture;Decreased balance  Assessment: The patient is a 80 y.o. female who presents to [facility] on 21 to be evaluated for some change in mental status, increased fatigue and increased tremor. PMHx: Dementia, pacemaker, mitral valve prolapse, glaucoma, aortic valve disease, sick sinus. Prior to admission, pt lived with spouse and son in one level home with level entry. Son reports the pt did require minimal assist with ADLs, however was ambulating independently up until the week before admission. Pt currently requiring 2 person assist and LIFT equipment for mobility. Recommend continued skilled therapy 3-5x/week to maximize independence and safety with functional mobility.   Treatment Diagnosis: impaired mobility  Prognosis: Fair;Poor  Decision Making: Medium Complexity  History: see below  Exam: see below  Clinical Presentation: evolving  PT Education: PT Role;Plan of Care;General Safety; Functional Mobility Training;Orientation  Barriers to Learning: cognition  REQUIRES PT FOLLOW UP: Yes  Activity Tolerance  Activity Tolerance: Patient limited by cognitive status     Patient Diagnosis(es): The encounter diagnosis was Altered mental status, unspecified altered mental status type. has a past medical history of Dementia ([address] Irregular heart beat, and Mitral valve prolapse.   has a past surgical history that includes Breast biopsy (Left); knee surgery (Left); and Hand surgery (Left, 6/11/2020). Restrictions  Restrictions/Precautions  Restrictions/Precautions: Fall Risk     Subjective   General  Chart Reviewed: Yes  Additional Pertinent Hx: The patient is a 80 y.o. female who presents to [facility] on 7/1/21 to be evaluated for some change in mental status, increased fatigue and increased tremor. PMHx: Dementia, pacemaker, mitral valve prolapse, glaucoma, aortic valve disease, sick sinus. Response To Previous Treatment: Patient unable to report, no changes reported from family or staff  Family / Caregiver Present: No  Referring Practitioner: DIXIE Pettit CNP  Subjective  Subjective: Pt is agreeable to PT          Orientation  Orientation  Overall Orientation Status: Impaired (oriented to first name only)     Cognition   Cognition  Overall Cognitive Status: Exceptions  Arousal/Alertness: Delayed responses to stimuli  Following Commands: Inconsistently follows commands; Follows one step commands with repetition; Follows one step commands with increased time  Attention Span: Attends with cues to redirect  Memory: Decreased recall of recent events;Decreased short term memory  Safety Judgement: Decreased awareness of need for assistance;Decreased awareness of need for safety  Problem Solving: Decreased awareness of errors  Insights: Fully aware of deficits  Initiation: Requires cues for all  Sequencing: Requires cues for all     Objective   Bed mobility  Supine to Sit: Maximum assistance;2 Person assistance  Sit to Supine: Unable to assess (pt in chair at end of session)  Scooting: Moderate assistance;Maximal assistance  Transfers  Sit to Stand: Minimal Assistance;2 Person Assistance (to stedy; max A of 2 to stand at edge of chair without stedy - heavy posterior lean)  Stand to sit: Minimal Assistance  Bed to Chair: Dependent/Total (stedy)  Ambulation  Ambulation?: No     Balance  Posture: Fair  Sitting - Static: Fair;+  Sitting - Dynamic: Fair  Standing - Static: Poor           AM-PAC Score  AM-PAC Inpatient Mobility Raw Score : 9 (07/06/21 1124)  AM-PAC Inpatient T-Scale Score : 30.55 (07/06/21 1124)  Mobility Inpatient CMS 0-100% Score: 81.38 (07/06/21 1124)  Mobility Inpatient CMS G-Code Modifier : CM (07/06/21 1124)          Goals  Short term goals  Time Frame for Short term goals: by acute discharge - all goals on going as of 7/6/21  Short term goal 1: bed mobility with min A  Short term goal 2: sit<>stand with min A to RW  Short term goal 3: ambulate > 5' with RW and min A  Patient Goals   Patient goals : none stated    Plan    Plan  Times per week: 3-5x/week  Current Treatment Recommendations: Strengthening, Transfer Training, Balance Training, Functional Mobility Training, Neuromuscular Re-education, Patient/Caregiver Education & Training, Safety Education & Training, Gait Training  Safety Devices  Type of devices:  All fall risk precautions in place, Call light within reach, Gait belt, Patient at risk for falls, Left in chair, Chair alarm in place, Nurse notified     Therapy Time   Individual Concurrent Group Co-treatment   Time In 1055         Time Out 1123         Minutes 28         Timed Code Treatment Minutes: [facility] [place], PT

## 2021-07-06 NOTE — PROGRESS NOTES
Infectious Disease Follow up Notes  Admit Date: 7/1/2021  Hospital Day: 6    Antibiotics : IV Ampicillin       CHIEF COMPLAINT:     Enterococcus bacteremia  PPM in place  Dementia'    Subjective interval History :  80 y.o. woman with Dementia, admitted with weakness and change in mentation found to have high grade Enterococcus bacteremia and has PPM in place concern was for Endocarditis also she has Mitral regurgitation-  is the main care giver he is at bed side discuss the plan with him        Past Medical History:    Past Medical History:   Diagnosis Date    Dementia ([address]     Irregular heart beat     Mitral valve prolapse        Past Surgical History:    Past Surgical History:   Procedure Laterality Date    BREAST BIOPSY Left     HAND SURGERY Left 6/11/2020    CLOSED REDUCTION LEFT SMALL FINGER DISLOCATION OF METACARPOPHALANGEAL JOINT performed by Ye Dimas MD at [address] [place] [address]        Current Medications:    Outpatient Medications Marked as Taking for the 7/1/21 encounter [facility] Encounter)   Medication Sig Dispense Refill    amiodarone (CORDARONE) 200 MG tablet Take 200 mg by mouth daily      apixaban (ELIQUIS) 5 MG TABS tablet Take 5 mg by mouth 2 times daily      metoprolol succinate (TOPROL XL) 25 MG extended release tablet Take 25 mg by mouth 2 times daily      OLANZapine (ZYPREXA) 5 MG tablet Take 5 mg by mouth every morning      latanoprost (XALATAN) 0.005 % ophthalmic solution Apply 1 drop to eye nightly         Allergies:  Patient has no known allergies.     Immunizations :   Immunization History   Administered Date(s) Administered    Tdap (Boostrix, Adacel) 06/10/2020       Social History:     Social History     Tobacco Use    Smoking status: Never Smoker    Smokeless tobacco: Never Used   Vaping Use    Vaping Use: Never used   Substance Use Topics    Alcohol use: No    Drug use: Never     Social History     Tobacco Use   Smoking Status Never Smoker   Smokeless Tobacco Never Used           REVIEW OF SYSTEMS:     Not possible due to dementia                 PHYSICAL EXAM:      Vitals:  T max  100.7   BP (!) 143/74   Pulse 65   Temp 98.1 °F (36.7 °C) (Oral)   Resp 16   Ht 5' 3\" (1.6 m)   Wt 119 lb 4.3 oz (54.1 kg)   SpO2 95%   BMI 21.13 kg/m²     General Appearance: awake and in some  acute distress, ++ pallor, no icterus flat affect   Skin: warm and dry, no rash or erythema  Head: normocephalic and atraumatic  Eyes: pupils equal, round, and reactive to light, conjunctivae normal  ENT: tympanic membrane, external ear and ear canal normal bilaterally, nose without deformity, nasal mucosa and turbinates normal without polyps  Neck: supple and non-tender without mass, no thyromegaly  no cervical lymphadenopathy  Pulmonary/Chest: Bi basal crepts+  no wheezes, rales or rhonchi, normal air movement, no respiratory distress  Cardiovascular:  S1 and S2, ESM++ Mitral regurgitation+  murmurs, rubs, clicks, or gallops, no carotid bruits  Abdomen: soft, non-tender, non-distended, normal bowel sounds, no masses or organomegaly  Extremities: no cyanosis, clubbing or edema  Musculoskeletal: normal range of motion, no joint swelling, deformity or tenderness  Integumentary: No rashes, no abnormal skin lesions, no petechiae  Neurologic: reflexes normal and symmetric, no cranial nerve deficit  Lines:  PPM+      Data Review:    CBC:   Lab Results   Component Value Date    WBC 4.2 07/03/2021    HGB 14.9 07/03/2021    HCT 43.1 07/03/2021    MCV 95.3 07/03/2021     07/03/2021     RENAL:   Lab Results   Component Value Date    CREATININE 0.8 07/03/2021    BUN 12 07/03/2021     07/03/2021    K 3.5 07/03/2021     07/03/2021    CO2 24 07/03/2021     SED RATE: No results found for: SEDRATE  CK: No results found for: CKTOTAL  CRP: No results found for: CRP  Hepatic Function Panel:   Lab Results Component Value Date    ALKPHOS 75 07/01/2021    ALT 13 07/01/2021    AST 20 07/01/2021    PROT 6.6 07/01/2021    BILITOT 0.5 07/01/2021    LABALBU 4.2 07/01/2021     UA:  Lab Results   Component Value Date    COLORU DK YELLOW 07/01/2021    CLARITYU Clear 07/01/2021    GLUCOSEU Negative 07/01/2021    BILIRUBINUR SMALL 07/01/2021    KETUA 15 07/01/2021    SPECGRAV 1.029 07/01/2021    BLOODU Negative 07/01/2021    PHUR 6.0 07/01/2021    PHUR 5.5 07/01/2021    PROTEINU TRACE 07/01/2021    UROBILINOGEN 1.0 07/01/2021    NITRU Negative 07/01/2021    LEUKOCYTESUR Negative 07/01/2021    LABMICR YES 07/01/2021    URINETYPE NotGiven 07/01/2021      Urine Microscopic:   Lab Results   Component Value Date    BACTERIA 1+ 02/09/2021    HYALCAST 7 07/01/2021    WBCUA 1 07/01/2021    RBCUA 7 07/01/2021    EPIU 4 07/01/2021     Urine Reflex to Culture:   Lab Results   Component Value Date    URRFLXCULT Not Indicated 07/01/2021         MICRO: cultures reviewed and updated by me   Blood Culture:          Culture, Blood 2 [[id number]] (Abnormal) Collected: 07/01/21 2012   Order Status: Completed Specimen: Blood Updated: 07/04/21 0806    Organism Enterococcus faecalisAbnormal     Culture, Blood 2 --    POSITIVE for   Isolated two of two sets   No further workup   Refer to culture #[id number] for sensitivity results    Narrative:     ORDER#: [id number]                          ORDERED BY: Cuco Gaitan   SOURCE: Blood                              COLLECTED:  07/01/21 20:12   ANTIBIOTICS AT MONY. :                      RECEIVED :  07/01/21 20:15   CALL  Grey  [id number] tel. [phone],   Previous panic on this admission - call not needed per SOP, 07/02/2021 09:06,   by ELLIE   If child <=2 yrs old please draw pediatric bottle. ~Blood Culture #2   Performed at:   [facility]   [address] [facility] [address] [address]   Phone [phone]   Culture, Blood 1 [[id number]] (Abnormal)  Collected: 07/01/21 1949 Order Status: Completed Specimen: Blood Updated: 07/04/21 0805    Organism Enterococcus faecalis DNA DetectedAbnormal     Blood Culture, Routine --    Jeffrey/B gene not detected. See additional report for complete BCID panel. Negative results for this marker does not indicate susceptibility,   as multiple mechanisms of resistance to Vancomycin exist.   Please see full susceptibility report. Organism Enterococcus faecalisAbnormal     Blood Culture, Routine --    POSITIVE for   Isolated two of two sets    Narrative:     ORDER#: [id number]                          ORDERED BY: Whit Del Real   SOURCE: Blood                              COLLECTED:  07/01/21 19:49   ANTIBIOTICS AT MONY. :                      RECEIVED :  07/01/21 19:49   CALL  Grey  [id number] tel. [phone],   Microbiology results called to and read back by Obey Trimble, 07/02/2021   09:03, by [employer]   Microbiology results called to and read back by VÍCTOR Lyman, 07/02/2021   09:02, by ELLIE   If child <=2 yrs old please draw pediatric bottle. ~Blood Culture 1   Performed at:   [facility]   [address] [facility] [address] [address]   Phone [phone]   Culture, Blood, PCR ID Panel Results Report [[id number]] Collected: 07/01/21 1949   Order Status: Completed Updated: 07/02/21 0905    Report SEE IMAGE   Narrative:     Lore Calabrese  [id number] tel. [phone],   Microbiology results called to and read back by Obey Trimble, 07/02/2021   09:03, by [employer]   Microbiology results called to and read back by VÍCTOR Lyman, 07/02/2021   09:02, by ELLIE   Referred out by:   [facility]   [address] [facility] [place] [address]   Phone [phone] Rapid [[id number]] Collected: 07/01/21 2230   Order Status: Completed Updated: 07/01/21 2306    SARS-CoV-2, NAAT Not Detected    Comment: Rapid NAAT:   Negative results should be treated as presumptive and,   if inconsistent with clinical signs and symptoms or necessary for   patient management, should be tested with an alternative molecular   assay. Negative results do not preclude SARS-CoV-2 infection and   should not be used as the sole basis for patient management decisions. This test has been authorized by the FDA under an Emergency Use   Authorization (EUA) for use by authorized laboratories. Fact sheet for Healthcare Providers:   Soni.rocco   Fact sheet for Patients: Soni.rocco     METHODOLOGY: Isothermal Nucleic Acid Amplification       Narrative:     Performed at:   [facility]   [address] [facility] [place] [address]   Phone [phone]     ORDER#: [id number]                          ORDERED BY: Marce Morocho   SOURCE: Blood                              COLLECTED:  07/01/21 19:49   ANTIBIOTICS AT MONY. :                      RECEIVED :  07/01/21 19:49   CALL  Grey  [id number] tel. [phone],   Microbiology results called to and read back by Hardik Gan, 07/02/2021   09:03, by [id number] [facility]   Microbiology results called to and read back by VÍCTOR Howell, 07/02/2021   09:02, by ELLIE   If child <=2 yrs old please draw pediatric bottle. ~Blood Culture 1   Performed at:   [facility]   [address] [facility] [place] [address]   Phone [phone]   Susceptibility    Enterococcus  faecalis (2)    Antibiotic Interpretation SILVIA Status    ampicillin Sensitive <=2 mcg/mL     vancomycin Sensitive 2 mcg/mL           Lab Results   Component Value Date    [facility]  07/01/2021     Jeffrey/B gene not detected. See additional report for complete BCID panel. Negative results for this marker does not indicate susceptibility,  as multiple mechanisms of resistance to Vancomycin exist.  Please see full susceptibility report.       BC POSITIVE for  Isolated two of two sets   07/01/2021    BLOODCULT2  07/01/2021     POSITIVE for  Isolated two of two sets  No further workup  Refer to culture #[id number] for sensitivity results         Respiratory Culture:  No results found for: Colin Moore  AFB:No results found for: [facility]  Viral Culture:  Lab Results   Component Value Date    COVID19 Not Detected 07/01/2021    COVID19 Not Detected 06/10/2020     Urine Culture: No results for input(s): Ravin Isbellial in the last 72 hours. TTE :  7/1/21 :     Summary   Dilated LV with normal wall motion and EF of   50%,   Grade II diastolic dysfunction with elevated LV filling pressures. Mild aortic regurgitation. Thickening of leaflets of mitral valve. Mitral valve prolapse of posterior   leaflet with severe medially directed eccentric regurgitation. Mildly dilated right ventricle. Right ventricular systolic function is   normal.   Mild tricuspid regurgitation. The right atrium is normal in size. Pacemaker / ICD lead is visualized in the right atrium. Signature      ------------------------------------------------------------------   Electronically signed by Aris Ely MD (Interpreting   physician) on 07/02/2021 at 04:38 PM   ------------------------------------------------------------------         IMAGING:    CT HEAD WO CONTRAST   Final Result   No acute intracranial abnormality. Diffuse atrophic changes with findings suggesting chronic microvascular   ischemia         XR CHEST PORTABLE   Final Result   Nonspecific subsegmental left basilar opacity.                All the pertinent images and reports for the current Hospitalization were reviewed by me     Scheduled Meds:   apixaban  2.5 mg Oral BID    sodium chloride flush  5-40 mL Intravenous 2 times per day    potassium chloride  20 mEq Oral BID WC    ampicillin IV  2,000 mg Intravenous Q6H    latanoprost  1 drop Ophthalmic Nightly    metoprolol succinate  25 mg Oral BID    [Held by provider] donepezil  10 mg Oral Nightly    amiodarone  200 mg Oral Daily staff d/w  at bed side in detail   Risk of Complications/Morbidity: High      · Illness(es)/ Infection present that pose threat to bodily function. · There is potential for severe exacerbation of infection/side effects of treatment. · Therapy requires intensive monitoring for antimicrobial agent toxicity. Thanks for allowing me to participate in your patient's care and please call me with any questions or concerns.     Demetrio New MD  Infectious Disease  [facility]) Physician  Phone: [phone]   Fax : [phone]

## 2021-07-07 LAB
ANION GAP SERPL CALCULATED.3IONS-SCNC: 10 MMOL/L (ref 3–16)
BUN BLDV-MCNC: 13 MG/DL (ref 7–20)
CALCIUM SERPL-MCNC: 8.7 MG/DL (ref 8.3–10.6)
CHLORIDE BLD-SCNC: 105 MMOL/L (ref 99–110)
CO2: 23 MMOL/L (ref 21–32)
CREAT SERPL-MCNC: 0.7 MG/DL (ref 0.6–1.2)
GFR AFRICAN AMERICAN: >60
GFR NON-AFRICAN AMERICAN: >60
GLUCOSE BLD-MCNC: 113 MG/DL (ref 70–99)
POTASSIUM SERPL-SCNC: 4 MMOL/L (ref 3.5–5.1)
SODIUM BLD-SCNC: 138 MMOL/L (ref 136–145)

## 2021-07-07 PROCEDURE — 6360000002 HC RX W HCPCS: Performed by: HOSPITALIST

## 2021-07-07 PROCEDURE — 6370000000 HC RX 637 (ALT 250 FOR IP): Performed by: NURSE PRACTITIONER

## 2021-07-07 PROCEDURE — 6370000000 HC RX 637 (ALT 250 FOR IP): Performed by: INTERNAL MEDICINE

## 2021-07-07 PROCEDURE — 99232 SBSQ HOSP IP/OBS MODERATE 35: CPT | Performed by: INTERNAL MEDICINE

## 2021-07-07 PROCEDURE — 80048 BASIC METABOLIC PNL TOTAL CA: CPT

## 2021-07-07 PROCEDURE — 36415 COLL VENOUS BLD VENIPUNCTURE: CPT

## 2021-07-07 PROCEDURE — 97530 THERAPEUTIC ACTIVITIES: CPT

## 2021-07-07 PROCEDURE — 2580000003 HC RX 258: Performed by: NURSE PRACTITIONER

## 2021-07-07 PROCEDURE — 1200000000 HC SEMI PRIVATE

## 2021-07-07 PROCEDURE — 2580000003 HC RX 258: Performed by: HOSPITALIST

## 2021-07-07 RX ORDER — BRIMONIDINE TARTRATE 2 MG/ML
1 SOLUTION/ DROPS OPHTHALMIC 2 TIMES DAILY
Status: DISCONTINUED | OUTPATIENT
Start: 2021-07-07 | End: 2021-07-08 | Stop reason: HOSPADM

## 2021-07-07 RX ADMIN — METOPROLOL SUCCINATE 25 MG: 25 TABLET, FILM COATED, EXTENDED RELEASE ORAL at 08:51

## 2021-07-07 RX ADMIN — AMPICILLIN SODIUM 2000 MG: 2 INJECTION, POWDER, FOR SOLUTION INTRAMUSCULAR; INTRAVENOUS at 17:11

## 2021-07-07 RX ADMIN — AMPICILLIN SODIUM 2000 MG: 2 INJECTION, POWDER, FOR SOLUTION INTRAMUSCULAR; INTRAVENOUS at 11:13

## 2021-07-07 RX ADMIN — Medication 10 ML: at 21:09

## 2021-07-07 RX ADMIN — AMPICILLIN SODIUM 2000 MG: 2 INJECTION, POWDER, FOR SOLUTION INTRAMUSCULAR; INTRAVENOUS at 04:29

## 2021-07-07 RX ADMIN — APIXABAN 2.5 MG: 2.5 TABLET, FILM COATED ORAL at 08:51

## 2021-07-07 RX ADMIN — POTASSIUM BICARBONATE 20 MEQ: 782 TABLET, EFFERVESCENT ORAL at 21:09

## 2021-07-07 RX ADMIN — DONEPEZIL HYDROCHLORIDE 10 MG: 10 TABLET, FILM COATED ORAL at 21:09

## 2021-07-07 RX ADMIN — ALPRAZOLAM 0.25 MG: 0.25 TABLET ORAL at 21:09

## 2021-07-07 RX ADMIN — METOPROLOL SUCCINATE 25 MG: 25 TABLET, FILM COATED, EXTENDED RELEASE ORAL at 21:09

## 2021-07-07 RX ADMIN — LATANOPROST 1 DROP: 50 SOLUTION OPHTHALMIC at 21:13

## 2021-07-07 RX ADMIN — AMIODARONE HYDROCHLORIDE 200 MG: 200 TABLET ORAL at 08:51

## 2021-07-07 RX ADMIN — AMPICILLIN SODIUM 2000 MG: 2 INJECTION, POWDER, FOR SOLUTION INTRAMUSCULAR; INTRAVENOUS at 21:42

## 2021-07-07 RX ADMIN — APIXABAN 2.5 MG: 2.5 TABLET, FILM COATED ORAL at 21:13

## 2021-07-07 RX ADMIN — BRIMONIDINE TARTRATE 1 DROP: 2 SOLUTION OPHTHALMIC at 21:19

## 2021-07-07 RX ADMIN — POTASSIUM BICARBONATE 20 MEQ: 782 TABLET, EFFERVESCENT ORAL at 08:51

## 2021-07-07 RX ADMIN — LATANOPROST 1 DROP: 50 SOLUTION OPHTHALMIC at 21:09

## 2021-07-07 ASSESSMENT — PAIN SCALES - WONG BAKER
WONGBAKER_NUMERICALRESPONSE: 0

## 2021-07-07 ASSESSMENT — PAIN SCALES - GENERAL: PAINLEVEL_OUTOF10: 0

## 2021-07-07 NOTE — PLAN OF CARE
Problem: Falls - Risk of:  Goal: Will remain free from falls  Description: Will remain free from falls  7/6/2021 2354 by Savannah Jones RN  Outcome: Ongoing  7/6/2021 1204 by Cristhian Lehman RN  Outcome: Ongoing  Note: Fall precautions in place. Bed in lowest position, wheels locked, call light in reach, non slip socks on, alarm armed. Call light in reach. Goal: Absence of physical injury  Description: Absence of physical injury  Outcome: Ongoing     Problem: Skin Integrity:  Goal: Will show no infection signs and symptoms  Description: Will show no infection signs and symptoms  7/6/2021 2354 by Savannah Jones RN  Outcome: Ongoing  7/6/2021 1204 by Cristhian Lehman RN  Note: Skin assessment per shift. Pt educated on turning and repositioning every two hours. Pillow support offered. Goal: Absence of new skin breakdown  Description: Absence of new skin breakdown  Outcome: Ongoing     Problem: Confusion - Acute:  Goal: Absence of continued neurological deterioration signs and symptoms  Description: Absence of continued neurological deterioration signs and symptoms  Outcome: Ongoing  Goal: Mental status will be restored to baseline  Description: Mental status will be restored to baseline  Outcome: Ongoing     Problem: Discharge Planning:  Goal: Ability to perform activities of daily living will improve  Description: Ability to perform activities of daily living will improve  Outcome: Ongoing  Goal: Participates in care planning  Description: Participates in care planning  Outcome: Ongoing  Goal: Discharged to appropriate level of care  Description: Discharged to appropriate level of care  Outcome: Ongoing     Problem: Injury - Risk of, Physical Injury:  Goal: Will remain free from falls  Description: Will remain free from falls  7/6/2021 2354 by Savannah Jones RN  Outcome: Ongoing  7/6/2021 1204 by Cristhian Lehman RN  Outcome: Ongoing  Note: Fall precautions in place.  Bed in lowest position, wheels locked, call light in reach, non slip socks on, alarm armed. Call light in reach.     Goal: Absence of physical injury  Description: Absence of physical injury  Outcome: Ongoing     Problem: Mood - Altered:  Goal: Mood stable  Description: Mood stable  Outcome: Ongoing  Goal: Absence of abusive behavior  Description: Absence of abusive behavior  Outcome: Ongoing  Goal: Verbalizations of feeling emotionally comfortable while being cared for will increase  Description: Verbalizations of feeling emotionally comfortable while being cared for will increase  Outcome: Ongoing     Problem: Psychomotor Activity - Altered:  Goal: Absence of psychomotor disturbance signs and symptoms  Description: Absence of psychomotor disturbance signs and symptoms  Outcome: Ongoing     Problem: Sensory Perception - Impaired:  Goal: Demonstrations of improved sensory functioning will increase  Description: Demonstrations of improved sensory functioning will increase  Outcome: Ongoing  Goal: Decrease in sensory misperception frequency  Description: Decrease in sensory misperception frequency  Outcome: Ongoing  Goal: Able to refrain from responding to false sensory perceptions  Description: Able to refrain from responding to false sensory perceptions  Outcome: Ongoing  Goal: Demonstrates accurate environmental perceptions  Description: Demonstrates accurate environmental perceptions  Outcome: Ongoing  Goal: Able to distinguish between reality-based and nonreality-based thinking  Description: Able to distinguish between reality-based and nonreality-based thinking  Outcome: Ongoing  Goal: Able to interrupt nonreality-based thinking  Description: Able to interrupt nonreality-based thinking  Outcome: Ongoing     Problem: Sleep Pattern Disturbance:  Goal: Appears well-rested  Description: Appears well-rested  Outcome: Ongoing

## 2021-07-07 NOTE — PROGRESS NOTES
Hospital Medicine Progress Note      Admit Date: 7/1/2021       CC: F/U for AMS    HPI: : Pt admitted 7/1/21 with AMS. Bld Cx 2/2 + E. Faecalis. Echo 7/2/21- Grade II DD,Mitral valve prolapse of posterior leaflet with severe medially directed eccentric regurgitation     Interval History/Subjective: no new complaints. Review of Systems:     The patient denied headaches, visual changes, LOC, SOB, CP, ABD pain, N/V/D, skin changes, new or worsening weakness or neuromuscular deficits. Comprehensive ROS negative except as mentioned above. Past Medical History:        Diagnosis Date    Dementia ([address]     Irregular heart beat     Mitral valve prolapse        Past Surgical History:        Procedure Laterality Date    BREAST BIOPSY Left     HAND SURGERY Left 6/11/2020    CLOSED REDUCTION LEFT SMALL FINGER DISLOCATION OF METACARPOPHALANGEAL JOINT [facility]        Allergies:  Patient has no known allergies. Past medical and surgical history reviewed. Any changes have been noted. PHYSICAL EXAM:  BP (!) 180/99   Pulse 78   Temp 98 °F (36.7 °C) (Oral)   Resp 18   Ht 5' 3\" (1.6 m)   Wt 123 lb 7.3 oz (56 kg)   SpO2 93%   BMI 21.87 kg/m²       Intake/Output Summary (Last 24 hours) at 7/7/2021 1029  Last data filed at 7/7/2021 0859  Gross per 24 hour   Intake 420 ml   Output 500 ml   Net -80 ml        General appearance:   No apparent distress, appears stated age. Cooperative. HEENT:  Normocephalic, atraumatic. PERRLA. EOMi. Conjunctivae/corneas clear, no icterus, non-injected. Neck: Supple, with full range of motion. No jugular venous distention. Trachea midline. Respiratory:  Normal respiratory effort. Clear to auscultation, bilaterally without Rales/Wheezes/Rhonchi. Cardiovascular: loud systolic murmur   No rubs or gallops. Abdomen: Soft, non-tender, non-distended, without rebound or guarding. Normal bowel sounds.   Musculoskeletal:  No clubbing, cyanosis or edema bilaterally. Full range of motion without deformity. Skin: Skin color, texture, turgor normal.  No rashes or lesions. Neurologic:  Neurovascularly intact without any focal sensory/motor deficits. Cranial nerves: II-XII intact, grossly intact. No facial asymmetry, tongue midline. Psychiatric:  Alert and oriented, thought content appropriate  Capillary Refill: Brisk,< 3 seconds   Peripheral Pulses: +2 palpable, equal bilaterally       LABS:    Lab Results   Component Value Date    WBC 4.2 07/03/2021    HGB 14.9 07/03/2021    HCT 43.1 07/03/2021    MCV 95.3 07/03/2021     07/03/2021    LYMPHOPCT 7.9 07/03/2021    RBC 4.53 07/03/2021    MCH 33.0 07/03/2021    MCHC 34.6 07/03/2021    RDW 13.2 07/03/2021       Lab Results   Component Value Date    CREATININE 0.7 07/07/2021    BUN 13 07/07/2021     07/07/2021    K 4.0 07/07/2021     07/07/2021    CO2 23 07/07/2021       Lab Results   Component Value Date    MG 1.80 07/03/2021       Lab Results   Component Value Date    ALT 13 07/01/2021    AST 20 07/01/2021    ALKPHOS 75 07/01/2021    BILITOT 0.5 07/01/2021        No flowsheet data found. No results found for: LABA1C    Imaging:  CT HEAD WO CONTRAST   Final Result   No acute intracranial abnormality. Diffuse atrophic changes with findings suggesting chronic microvascular   ischemia         XR CHEST PORTABLE   Final Result   Nonspecific subsegmental left basilar opacity.              Scheduled and prn Medications:    Scheduled Meds:   potassium bicarb-citric acid  20 mEq Oral BID    apixaban  2.5 mg Oral BID    sodium chloride flush  5-40 mL Intravenous 2 times per day    ampicillin IV  2,000 mg Intravenous Q6H    latanoprost  1 drop Ophthalmic Nightly    metoprolol succinate  25 mg Oral BID    [Held by provider] donepezil  10 mg Oral Nightly    amiodarone  200 mg Oral Daily     Continuous Infusions:   sodium chloride       PRN Meds:.sodium chloride flush, sodium chloride, ondansetron **OR** ondansetron, polyethylene glycol, acetaminophen **OR** acetaminophen, ALPRAZolam    Assessment & Plan:        Acute metabolic encephalopathy 2/2 to enterococcal bacteremia  -Enterococcus faecalis bacteremia 2/2--- unclear source though it is possible this is 2/2/ colonic neoplasm. Suspect  infective endocarditis with PM seeding-patient has severe MR  -Severe mitral valve prolapse and mitral regurgitation  -Paroxysmal A. Fib and SSS--rhythm paced--on Eliquis, amiodarone, metoprolol-stable  - last interrogation 4/16/21  -Sick sinus syndrome status post permanent pacemaker in the past  -Alzheimer's dementia-on Aricept  -Essential hypertension-stable  -Hypokalemia-resolved     Plan:  -Empiric antibiotics--IV ampicillin for Enterococcus faecalis bacteremia with concern for endocarditis--awaiting for sensitivities. If VRE, will switch to vanc or linezolid   -? Need for MANE, cards consulted   - has known mitral valve prolapse/severe mitral regurg.   - outpatient note states she is not candidate for repair or replace w/cormorbidites. -ID following, consult placed for cardiology(eval PM removal)  -Discussed with family at bedside  -Continue chronic medications  - per cardiology would not persue lead extraction as the pt is pacemaker dependent unless infection unable to be cleared  - follow repeat blood cultures (drawn 7/6)    Continue current regimen/therapies. Monitor. Adjust medical regimen as appropriate. Body mass index is 21.87 kg/m². The patient and / or the family were informed of the results of any tests, a time was given to answer questions, a plan was proposed and they agreed with plan.       DVT ppx: eliquis      Diet: ADULT DIET; Easy to Chew  Adult Oral Nutrition Supplement; Frozen Oral Supplement  Adult Oral Nutrition Supplement; Standard High Calorie/High Protein Oral Supplement    Consults:  IP CONSULT TO SOCIAL WORK  IP CONSULT TO INFECTIOUS DISEASES  IP CONSULT TO CARDIOLOGY  IP CONSULT TO DIETITIAN    DISPO/placement plan: pending    Code Status: DNR-CCA      Mirtha Stevens, DIXIE - SUSAN  07/07/21

## 2021-07-07 NOTE — PROGRESS NOTES
Occupational Therapy  Facility/Department: [address] MED SURG  Daily Treatment Note  NAME: Ernesto Gray  :   MRN: [id number]    Date of Service: 2021    Discharge Recommendations:  Patient would benefit from continued therapy after discharge, 3-5 sessions per week  OT Equipment Recommendations  Equipment Needed: No    Ernesto Gray scored a 9/24 on the AM-PAC ADL Inpatient form. Current research shows that an AM-PAC score of 17 or less is typically not associated with a discharge to the patient's home setting. Based on the patient's AM-PAC score and their current ADL deficits, it is recommended that the patient have 3-5 sessions per week of Occupational Therapy at d/c to increase the patient's independence. Please see assessment section for further patient specific details. If patient discharges prior to next session this note will serve as a discharge summary. Please see below for the latest assessment towards goals. Assessment   Performance deficits / Impairments: Decreased functional mobility ; Decreased endurance;Decreased strength;Decreased safe awareness;Decreased high-level IADLs;Decreased ADL status; Decreased balance;Decreased cognition;Decreased coordination  Assessment: Pt continues to function below baseline, limited in self-care by B UE tremors, generalized weakness, decreased balance/fxl mobility, and cognitive deficits. This date, pt required assist x2 for bed mobility and fxl transfer using lift equipment (víctor stedy), max A/total A feeding, and anticipate pt would require overall max A/total A for ADLs. Pt pleasantly confused, but able to follow simple commands to participate with increase time/repetition. Pt with decreased initiation for task and minimal carry over. At current status, AM-PAC score is NOT associated with a homebound d/c and indicates need for ongoing skilled OT to maximize pt's safety, independence, and to decrease caregiver burden.  Will cont to follow while hopitalized. Prognosis: Fair  OT Education: OT Role;Plan of Care;Transfer Training;Orientation; Family Education  REQUIRES OT FOLLOW UP: Yes  Activity Tolerance  Activity Tolerance: Treatment limited secondary to decreased cognition;Patient limited by fatigue         Patient Diagnosis(es): The encounter diagnosis was Altered mental status, unspecified altered mental status type. has a past medical history of Dementia ([facility] [address] Irregular heart beat, and Mitral valve prolapse.   has a past surgical history that includes Breast biopsy (Left); knee surgery (Left); and Hand surgery (Left, 6/11/2020). Restrictions  Restrictions/Precautions  Restrictions/Precautions: Fall Risk  Subjective   General  Chart Reviewed: Yes  Patient assessed for rehabilitation services?: Yes  Additional Pertinent Hx: 79 y/o female admitted 7/1/2021 with AMS, fatigue, increased tremor. Covid negative. CT of her head is negative for ICH. urine drug screen is positive for benzodiazepines. Pt with history of dementia  Family / Caregiver Present: Yes ( (stepped out for session))  Referring Practitioner: DIXIE Deluca CNP  Diagnosis: AMS, fatigue, tremors  Subjective  Subjective: Pt met b/s for OT cotx with PT. Pt in bed on arrival, flat affect but agreeable to participate in therapy. Pt pleasantly confused. Pt denies pain. Objective    Balance  Sitting Balance: Contact guard assistance (EOB in prep for transfers)  Standing Balance  Time: stood prn with víctor washburn support to transfer to chair  Functional Mobility  Functional Mobility Comments: total A via [facility] stedy for bed-chair     Bed mobility  Supine to Sit: Maximum assistance;2 Person assistance (HOB elevated)  Sit to Supine:  (pt in chair at end of session)  Scooting: Moderate assistance;Maximal assistance        Cognition  Overall Cognitive Status: Exceptions  Arousal/Alertness: Delayed responses to stimuli  Following Commands:  Follows one step commands with

## 2021-07-07 NOTE — CONSULTS
Comprehensive Nutrition Assessment    Type and Reason for Visit:  Consult (Poor intake/appetite 5 ormore days/ oral nutrition supplments)    Nutrition Recommendations/Plan:   Continue Easy to chew diet. Start Magic cup BID. Start Ensure BID. Nutrition Assessment:  Consult for Poor intake/appetite and nutrition supplements. Pt with PMH of dementia presented with AMS. Pt with possible endocarditis. Pt only eating a few bites of food. Noted intakes <25%. No recent wt loss per EMR noted. Will trial ONS and encourage PO intakes. Malnutrition Assessment:  Malnutrition Status:   At risk for malnutrition     Context:  Acute Illness     Findings of the 6 clinical characteristics of malnutrition:  Energy Intake:  7 - 50% or less of estimated energy requirements for 5 or more days  Weight Loss:  No significant weight loss     Body Fat Loss:  Unable to assess     Muscle Mass Loss:  Unable to assess    Fluid Accumulation:  No significant fluid accumulation     Strength:  Not Performed    Estimated Daily Nutrient Needs:  Energy (kcal):  1597-4852 kcal (25-30 kcal/kg CBW)  Protein (g):  56-73 gm (1-1.3gm/kg CBW)   Fluid (ml/day):  1 mL/kcal    Nutrition Related Findings:  BM 7/3; labs reviewed      Wounds:  None       Current Nutrition Therapies:    ADULT DIET; Easy to Chew    Anthropometric Measures:  · Height: 5' 3\" (160 cm)  · Current Body Weight: 123 lb (55.8 kg)   · Admission Body Weight: 118 lb (53.5 kg)    · Usual Body Weight: 115 lb (52.2 kg)     · Ideal Body Weight: 115 lbs; % Ideal Body Weight 107 %   · BMI: 21.8  · BMI Categories: Underweight (BMI less than 22) age over 72       Nutrition Diagnosis:   · Inadequate oral intake related to cognitive or neurological impairment as evidenced by intake 0-25%    Nutrition Interventions:   Food and/or Nutrient Delivery:  Continue Current Diet, Start Oral Nutrition Supplement  Nutrition Education/Counseling:  No recommendation at this time   Coordination of Nutrition Care:  Continue to monitor while inpatient    Goals:  consume >50% of meals and ONS during admission       Nutrition Monitoring and Evaluation:   Food/Nutrient Intake Outcomes:  Food and Nutrient Intake, Supplement Intake  Physical Signs/Symptoms Outcomes:  Biochemical Data, Weight, Skin, Nutrition Focused Physical Findings, Meal Time Behavior     Discharge Planning:     Too soon to determine     Electronically signed by Javan Mercedes RD, LD on 7/7/21 at 9:36 AM EDT    Contact: [phone]

## 2021-07-07 NOTE — PROGRESS NOTES
Pt alert and forgetful. Pt cooperative with crushed med's. Small amount of liquid drank, and a few bites of breakfast eaten. Will enc pt to drink and eat. No signs of pain or discomfort. Call light with in reach. Bed alarm on. Will cont to monitor.

## 2021-07-07 NOTE — PROGRESS NOTES
Physical Therapy  Facility/Department: [address] MED SURG  Daily Treatment Note  NAME: Delilah Cunningham  :   MRN: [id number]    Date of Service: 2021    Discharge Recommendations:  Patient would benefit from continued therapy after discharge, 3-5 sessions per week     Delilah Cunningham scored a  on the AM-PAC short mobility form. Current research shows that an AM-PAC score of 17 or less is typically not associated with a discharge to the patient's home setting. Based on the patient's AM-PAC score and their current functional mobility deficits, it is recommended that the patient have 3-5 sessions per week of Physical Therapy at d/c to increase the patient's independence. Please see assessment section for further patient specific details. If patient discharges prior to next session this note will serve as a discharge summary. Please see below for the latest assessment towards goals. Assessment   Body structures, Functions, Activity limitations: Decreased functional mobility ; Decreased ADL status; Decreased strength;Decreased cognition;Decreased endurance;Decreased posture;Decreased balance  Assessment: The patient is a 80 y.o. female who presents to [facility] on 21 to be evaluated for some change in mental status, increased fatigue and increased tremor. PMHx: Dementia, pacemaker, mitral valve prolapse, glaucoma, aortic valve disease, sick sinus. Prior to admission, pt lived with spouse and son in one level home with level entry. Son reports the pt did require minimal assist with ADLs, however was ambulating independently up until the week before admission. Pt currently requiring 2 person assist and LIFT equipment for mobility. She continues to be limited by cognition, as well as the inversion of her right foot - pt may benefit from a splint for her R foot to eliminate further inversion.  Recommend continued skilled therapy 3-5x/week to maximize independence and safety with errors;Assistance required to identify errors made;Assistance required to generate solutions  Insights: Decreased awareness of deficits  Initiation: Requires cues for all  Sequencing: Requires cues for all     Objective   Bed mobility  Supine to Sit: Maximum assistance;2 Person assistance  Sit to Supine: Unable to assess (pt in chair at end of session)  Scooting: Moderate assistance;Maximal assistance  Transfers  Sit to Stand: 2 Person Assistance; Moderate Assistance (within stedy)  Stand to sit: Minimal Assistance; Moderate Assistance  Bed to Chair: Dependent/Total (stedy)  Ambulation  Ambulation?: No     Balance  Posture: Fair  Sitting - Static: Fair;+  Sitting - Dynamic: Fair  Standing - Static: Poor        AM-PAC Score  AM-PAC Inpatient Mobility Raw Score : 7 (07/07/21 1341)  AM-PAC Inpatient T-Scale Score : 26.42 (07/07/21 1341)  Mobility Inpatient CMS 0-100% Score: 92.36 (07/07/21 1341)  Mobility Inpatient CMS G-Code Modifier : CM (07/07/21 1341)          Goals  Short term goals  Time Frame for Short term goals: by acute discharge - all goals on going as of 7/7/21  Short term goal 1: bed mobility with min A  Short term goal 2: sit<>stand with min A to RW  Short term goal 3: ambulate > 5' with RW and min A  Patient Goals   Patient goals : none stated    Plan    Plan  Times per week: 3-5x/week  Current Treatment Recommendations: Strengthening, Transfer Training, Balance Training, Functional Mobility Training, Neuromuscular Re-education, Patient/Caregiver Education & Training, Safety Education & Training, Gait Training  Safety Devices  Type of devices:  All fall risk precautions in place, Call light within reach, Gait belt, Patient at risk for falls, Left in chair, Chair alarm in place, Nurse notified     Therapy Time   Individual Concurrent Group Co-treatment   Time In 1315         Time Out 1340         Minutes 25         Timed Code Treatment Minutes: 25 Minutes       Patricia Gustafson PT

## 2021-07-07 NOTE — PROGRESS NOTES
Infectious Disease Follow up Notes  Admit Date: 7/1/2021  Hospital Day: 7    Antibiotics : IV Ampicillin       CHIEF COMPLAINT:     Enterococcus bacteremia  PPM in place  Dementia'    Subjective interval History :  80 y.o. 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 [facility] 47 Left        Current Medications:    Outpatient Medications Marked as Taking for the 7/1/21 encounter [facility] HOSPITAL Encounter)   Medication Sig Dispense Refill    amiodarone (CORDARONE) 200 MG tablet Take 200 mg by mouth daily      apixaban (ELIQUIS) 5 MG TABS tablet Take 5 mg by mouth 2 times daily      metoprolol succinate (TOPROL XL) 25 MG extended release tablet Take 25 mg by mouth 2 times daily      OLANZapine (ZYPREXA) 5 MG tablet Take 5 mg by mouth every morning      latanoprost (XALATAN) 0.005 % ophthalmic solution Apply 1 drop to eye nightly         Allergies:  Patient has no known allergies.     Immunizations :   Immunization History   Administered Date(s) Administered    Tdap (Boostrix, Adacel) 06/10/2020       Social History:     Social History     Tobacco Use    Smoking status: Never Smoker    Smokeless tobacco: Never Used   Vaping Use    Vaping Use: Never used   Substance Use Topics    Alcohol use: No    Drug use: Never     Social History     Tobacco Use   Smoking Status Never Smoker   Smokeless Tobacco Never Used           REVIEW OF SYSTEMS:     Not possible due to dementia                 PHYSICAL EXAM:      Vitals:  T max  100.7   BP (!) 180/99   Pulse 78   Temp 98 °F (36.7 °C) (Oral)   Resp 18   Ht 5' 3\" (1.6 m)   Wt 123 lb 7.3 oz (56 kg)   SpO2 93%   BMI 21.87 kg/m²     General Appearance: awake and in some  acute distress, ++ pallor, no icterus flat affect   Skin: warm and dry, no rash or erythema  Head: normocephalic and atraumatic  Eyes: pupils equal, round, and reactive to light, conjunctivae normal  ENT: tympanic membrane, external ear and ear canal normal bilaterally, nose without deformity, nasal mucosa and turbinates normal without polyps  Neck: supple and non-tender without mass, no thyromegaly  no cervical lymphadenopathy  Pulmonary/Chest: Bi basal crepts+  no wheezes, rales or rhonchi, normal air movement, no respiratory distress  Cardiovascular:  S1 and S2, ESM++ Mitral regurgitation+  murmurs, rubs, clicks, or gallops, no carotid bruits  Abdomen: soft, non-tender, non-distended, normal bowel sounds, no masses or organomegaly  Extremities: no cyanosis, clubbing or edema  Musculoskeletal: normal range of motion, no joint swelling, deformity or tenderness  Integumentary: No rashes, no abnormal skin lesions, no petechiae  Neurologic: reflexes normal and symmetric, no cranial nerve deficit  Lines:  PPM+      Data Review:    CBC:   Lab Results   Component Value Date    WBC 4.2 07/03/2021    HGB 14.9 07/03/2021    HCT 43.1 07/03/2021    MCV 95.3 07/03/2021     07/03/2021     RENAL:   Lab Results   Component Value Date    CREATININE 0.7 07/07/2021    BUN 13 07/07/2021     07/07/2021    K 4.0 07/07/2021     07/07/2021    CO2 23 07/07/2021     SED RATE: No results found for: SEDRATE  CK: No results found for: CKTOTAL  CRP: No results found for: CRP  Hepatic Function Panel:   Lab Results   Component Value Date    ALKPHOS 75 07/01/2021    ALT 13 07/01/2021    AST 20 07/01/2021    PROT 6.6 07/01/2021    BILITOT 0.5 07/01/2021    LABALBU 4.2 07/01/2021     UA:  Lab Results   Component Value Date    COLORU DK YELLOW 07/01/2021    CLARITYU Clear 07/01/2021    GLUCOSEU Negative 07/01/2021    BILIRUBINUR SMALL 07/01/2021    KETUA 15 07/01/2021    SPECGRAV 1.029 07/01/2021    BLOODU Negative 07/01/2021    PHUR 6.0 07/01/2021    PHUR 5.5 07/01/2021    PROTEINU TRACE 07/01/2021    UROBILINOGEN 1.0 07/01/2021    NITRU Negative 07/01/2021    LEUKOCYTESUR Negative 07/01/2021    LABMICR YES 07/01/2021    URINETYPE NotGiven 07/01/2021      Urine Microscopic:   Lab Results   Component Value Date    BACTERIA 1+ 02/09/2021    HYALCAST 7 07/01/2021    WBCUA 1 07/01/2021    RBCUA 7 07/01/2021    EPIU 4 07/01/2021     Urine Reflex to Culture:   Lab Results   Component Value Date    URRFLXCULT Not Indicated 07/01/2021         MICRO: cultures reviewed and updated by me   Blood Culture:          Culture, Blood 2 [[id number]] (Abnormal) Collected: 07/01/21 2012   Order Status: Completed Specimen: Blood Updated: 07/04/21 0806    Organism Enterococcus faecalisAbnormal     Culture, Blood 2 --    POSITIVE for   Isolated two of two sets   No further workup   Refer to culture #[id number] for sensitivity results    Narrative:     ORDER#: [id number]                          ORDERED BY: Mo    SOURCE: Blood                              COLLECTED:  07/01/21 20:12   ANTIBIOTICS AT MONY. :                      RECEIVED :  07/01/21 20:15   CALL  Grey  [id number] tel. [phone],   Previous panic on this admission - call not needed per SOP, 07/02/2021 09:06,   by ELLIE   If child <=2 yrs old please draw pediatric bottle. ~Blood Culture #2   Performed at:   [facility]   [address] [place] [address]   Phone [phone]   Culture, Blood 1 [[id number]] (Abnormal)  Collected: 07/01/21 1949   Order Status: Completed Specimen: Blood Updated: 07/04/21 0805    Organism Enterococcus faecalis DNA DetectedAbnormal     Blood Culture, Routine --    Jeffrey/B gene not detected. See additional report for complete BCID panel. Negative results for this marker does not indicate susceptibility,   as multiple mechanisms of resistance to Vancomycin exist.   Please see full susceptibility report. Organism Enterococcus faecalisAbnormal     Blood Culture, Routine --    POSITIVE for   Isolated two of two sets    Narrative:     ORDER#: [id number]                          ORDERED BY: Dossie Kocher   SOURCE: Blood                              COLLECTED:  07/01/21 19:49   ANTIBIOTICS AT MONY. :                      RECEIVED :  07/01/21 19:49   CALL  Grey  [id number] tel. [phone],   Microbiology results called to and read back by Jimenez Kidney, 07/02/2021   09:03, by thereNow   Microbiology results called to and read back by VÍCTOR Brown, 07/02/2021   09:02, by ELLIE   If child <=2 yrs old please draw pediatric bottle. ~Blood Culture 1   Performed at:   [facility]   [address] [place] [address]   Phone [phone]   Culture, Blood, PCR ID Panel Results Report [[id number]] Collected: 07/01/21 1949   Order Status: Completed Updated: 07/02/21 0905    Report SEE IMAGE   Narrative:     Aris Bowie  [id number] tel. [phone],   Microbiology results called to and read back by Tishaine Kidney, 07/02/2021   09:03, by thereNow   Microbiology results called to and read back by VÍCTOR Brown, 07/02/2021   09:02, by ELLIE   Referred out by:   [facility]   [address] [place] [address]   Phone [phone], Rapid [[id number]] Collected: 07/01/21 2230   Order Status: Completed Updated: 07/01/21 2306    SARS-CoV-2, NAAT Not Detected    Comment: Rapid NAAT:   Negative results should be treated as presumptive and,   if inconsistent with clinical signs and symptoms or necessary for   patient management, should be tested with an alternative molecular   assay. Negative results do not preclude SARS-CoV-2 infection and   should not be used as the sole basis for patient management decisions. This test has been authorized by the FDA under an Emergency Use   Authorization (EUA) for use by authorized laboratories. Fact sheet for Healthcare Providers:   Soni.es   Fact sheet for Patients: Soni.rocco     METHODOLOGY: Isothermal Nucleic Acid Amplification       Narrative:     Performed at:   [place] [address] [address] [place] [address]   Phone [phone]     ORDER#: [id number]                          ORDERED BY: Kathryn Liz   SOURCE: Blood                              COLLECTED:  07/01/21 19:49   ANTIBIOTICS AT MONY. :                      RECEIVED :  07/01/21 19:49   CALL  Grey  Z tel. [phone],   Microbiology results called to and read back by Edu Hays, 07/02/2021   09:03, by [id number]   Microbiology results called to and read back by VÍCTOR Zaragoza, 07/02/2021   09:02, by ELLIE   If child <=2 yrs old please draw pediatric bottle. ~Blood Culture 1   Performed at:   [place] [address] [address] [employer] [address]   Phone [phone]   Susceptibility    Enterococcus  faecalis (2)    Antibiotic Interpretation SILVIA Status    ampicillin Sensitive <=2 mcg/mL     vancomycin Sensitive 2 mcg/mL           Lab Results   Component Value Date    [facility]  07/06/2021     No Growth to date. Any change in status will be called.     Fior Zapata  07/01/2021     POSITIVE for  Isolated two of two sets  No further workup  Refer to culture #[id number] for sensitivity results         Respiratory Culture:  No results found for: Ion Bhardwaj  AFB:No results found for: AFBSMEAR  Viral Culture:  Lab Results   Component Value Date    COVID19 Not Detected 07/01/2021    COVID19 Not Detected 06/10/2020     Urine Culture: No results for input(s): Raydell Paget in the last 72 hours. TTE :  7/1/21 :     Summary   Dilated LV with normal wall motion and EF of   50%,   Grade II diastolic dysfunction with elevated LV filling pressures. Mild aortic regurgitation. Thickening of leaflets of mitral valve. Mitral valve prolapse of posterior   leaflet with severe medially directed eccentric regurgitation. Mildly dilated right ventricle. Right ventricular systolic function is   normal.   Mild tricuspid regurgitation. The right atrium is normal in size. Pacemaker / ICD lead is visualized in the right atrium. Signature      ------------------------------------------------------------------   Electronically signed by Montana Dunne MD (Interpreting   physician) on 07/02/2021 at 04:38 PM   ------------------------------------------------------------------         IMAGING:    CT HEAD WO CONTRAST   Final Result   No acute intracranial abnormality. Diffuse atrophic changes with findings suggesting chronic microvascular   ischemia         XR CHEST PORTABLE   Final Result   Nonspecific subsegmental left basilar opacity.                All the pertinent images and reports for the current Hospitalization were reviewed by me     Scheduled Meds:   brimonidine  1 drop Ophthalmic BID    potassium bicarb-citric acid  20 mEq Oral BID    apixaban  2.5 mg Oral BID    sodium chloride flush  5-40 mL Intravenous 2 times per day    ampicillin IV  2,000 mg Intravenous Q6H    latanoprost  1 drop Ophthalmic Nightly    metoprolol succinate  25 mg Oral BID    donepezil  10 mg Oral Nightly    amiodarone  200 mg Oral Daily       Continuous Infusions:   sodium chloride         PRN Meds:  sodium chloride flush, sodium chloride, ondansetron **OR** ondansetron, polyethylene glycol, acetaminophen **OR** acetaminophen, ALPRAZolam      Assessment:     Patient Active Problem List   Diagnosis    AMS (altered mental status)    Bacteremia    Status cardiac pacemaker    Late onset Alzheimer's dementia without behavioral disturbance ([address]    Pacemaker electrode infection ([address]    Severe mitral regurgitation    Mitral valve prolapse    SSS (sick sinus syndrome) (HCC)    PAF (paroxysmal atrial fibrillation) (HCC)    Dementia without behavioral disturbance (HCC)     Enterococcus high grade Bacteremia  PPM in place  On chronic Anticoagulation   Mitral regurgitation+  Advanced Dementia+  WBC normal   TTE -VE    D/W  at bed side and given  Her Dementia and over all health condition would cont IV abx as in patient and change to oral Penicillin or Amoxicillin at d/c for x 6 weeks duration and recheck Blood cx as out patient off abx therapy to see if she relapses then would need prolonged abx course with oral abx suppression    Agree with cardiology she is not a candidate for Lead removal or further aggressive measures that may not improve long term out come     Blood from 7/6 nGTD and if cx remain negative we can choose oral Amoxicillin for d/c planning for x 6 weeks with follow up blood cx       Labs, Microbiology, Radiology and all the pertinent results from current hospitalization and  care every where were reviewed  by me as a part of the evaluation   Plan:   1. Cont IV Ampicillin x 2 gm x q 6 hrs  As in patient    2. IF she cannot Keep IV access will switch to oral Amoxicillin less frequent dosing x 500 mg x Q 8 hrs  3. Repeat Blood cx 7/6 nGTD  4. She will go home on oral abx at d/c for x 6 weeks  5. Will see if we can repeat Blood cx as out patient once off abx therapy and to check for any relapse then would pursue life  long suppressive course  6.  Would be ok for d/c planning tomorrow on oral Amoxicillin x 500 mg  X q 8 HR X 6 WEEKS     Discussed with patient/Family and Nursing staff        Thanks

## 2021-07-08 VITALS
TEMPERATURE: 97.9 F | RESPIRATION RATE: 16 BRPM | WEIGHT: 123.02 LBS | OXYGEN SATURATION: 94 % | HEART RATE: 67 BPM | HEIGHT: 63 IN | SYSTOLIC BLOOD PRESSURE: 151 MMHG | DIASTOLIC BLOOD PRESSURE: 79 MMHG | BODY MASS INDEX: 21.8 KG/M2

## 2021-07-08 LAB — SARS-COV-2, NAAT: NOT DETECTED

## 2021-07-08 PROCEDURE — 87635 SARS-COV-2 COVID-19 AMP PRB: CPT

## 2021-07-08 PROCEDURE — 2580000003 HC RX 258: Performed by: NURSE PRACTITIONER

## 2021-07-08 PROCEDURE — 2580000003 HC RX 258: Performed by: HOSPITALIST

## 2021-07-08 PROCEDURE — 6370000000 HC RX 637 (ALT 250 FOR IP): Performed by: NURSE PRACTITIONER

## 2021-07-08 PROCEDURE — 6370000000 HC RX 637 (ALT 250 FOR IP): Performed by: INTERNAL MEDICINE

## 2021-07-08 PROCEDURE — 6360000002 HC RX W HCPCS: Performed by: HOSPITALIST

## 2021-07-08 RX ORDER — AMOXICILLIN 500 MG/1
500 CAPSULE ORAL 3 TIMES DAILY
Qty: 126 CAPSULE | Refills: 0 | Status: SHIPPED | OUTPATIENT
Start: 2021-07-08 | End: 2021-08-19

## 2021-07-08 RX ORDER — AMOXICILLIN 500 MG/1
500 CAPSULE ORAL 3 TIMES DAILY
Qty: 126 CAPSULE | Refills: 0 | Status: SHIPPED | OUTPATIENT
Start: 2021-07-08 | End: 2021-07-08 | Stop reason: SDUPTHER

## 2021-07-08 RX ADMIN — AMPICILLIN SODIUM 2000 MG: 2 INJECTION, POWDER, FOR SOLUTION INTRAMUSCULAR; INTRAVENOUS at 11:05

## 2021-07-08 RX ADMIN — AMPICILLIN SODIUM 2000 MG: 2 INJECTION, POWDER, FOR SOLUTION INTRAMUSCULAR; INTRAVENOUS at 04:00

## 2021-07-08 RX ADMIN — METOPROLOL SUCCINATE 25 MG: 25 TABLET, FILM COATED, EXTENDED RELEASE ORAL at 09:04

## 2021-07-08 RX ADMIN — BRIMONIDINE TARTRATE 1 DROP: 2 SOLUTION OPHTHALMIC at 09:05

## 2021-07-08 RX ADMIN — Medication 10 ML: at 09:05

## 2021-07-08 RX ADMIN — APIXABAN 2.5 MG: 2.5 TABLET, FILM COATED ORAL at 09:04

## 2021-07-08 RX ADMIN — POTASSIUM BICARBONATE 20 MEQ: 782 TABLET, EFFERVESCENT ORAL at 09:04

## 2021-07-08 RX ADMIN — AMIODARONE HYDROCHLORIDE 200 MG: 200 TABLET ORAL at 09:04

## 2021-07-08 ASSESSMENT — PAIN SCALES - GENERAL: PAINLEVEL_OUTOF10: 0

## 2021-07-08 NOTE — PROGRESS NOTES
Transport here to transfer pt to [facility].  Electronically signed by Caesar Mcdonald RN on 7/8/2021 at 2:19 PM

## 2021-07-08 NOTE — PROGRESS NOTES
Pt disoriented at this time, tremors BUE continuous. Denies pain at this time, will continue to monitor. LS CTA, murmur noted to heart sounds, bowel sounds active/present. +1 pitting edema BLE at this time. Will continue to monitor. Footdrop noted, boots in place. Bed in lowest position, call light in reach, nonskid footwear on pt, visitor bedside, bed alarm in place, possessions in reach, hourly rounding in place.  Electronically signed by Jodi Phillips RN on 7/8/2021 at 9:04 AM

## 2021-07-08 NOTE — CARE COORDINATION
Dc order noted, called son Prasad Paci) to let him know dc order placed & [address] can accept.     Electronically signed by Rafael Goldberg RN on 7/8/21 at 10:19 AM EDT

## 2021-07-08 NOTE — PLAN OF CARE
Problem: Skin Integrity:  Goal: Will show no infection signs and symptoms  Description: Will show no infection signs and symptoms  7/8/2021 1050 by Chadwick Velazco RN  Outcome: Ongoing   Educated on s/s of infection. Infection control measures in place. Problem: Skin Integrity:  Goal: Absence of new skin breakdown  Description: Absence of new skin breakdown  7/8/2021 1050 by Chadwick Velazco RN  Outcome: Ongoing   Educated on turning every two hours in bed, turn schedule in place. Problem: Confusion - Acute:  Goal: Absence of continued neurological deterioration signs and symptoms  Description: Absence of continued neurological deterioration signs and symptoms  7/8/2021 1050 by Chadwick Velazco RN  Outcome: Ongoing     Problem: Confusion - Acute:  Goal: Mental status will be restored to baseline  Description: Mental status will be restored to baseline  7/8/2021 1050 by Chadwick Velazco RN  Outcome: Ongoing     Problem: Discharge Planning:  Goal: Ability to perform activities of daily living will improve  Description: Ability to perform activities of daily living will improve  7/8/2021 1050 by Chadwick Velazco RN  Outcome: Ongoing     Problem: Discharge Planning:  Goal: Participates in care planning  Description: Participates in care planning  7/8/2021 1050 by Chadwick Velazco RN  Outcome: Ongoing     Problem: Discharge Planning:  Goal: Discharged to appropriate level of care  Description: Discharged to appropriate level of care  7/8/2021 1050 by Chadwick Velazco RN  Outcome: Ongoing     Problem: Injury - Risk of, Physical Injury:  Goal: Will remain free from falls  Description: Will remain free from falls  7/8/2021 1050 by Chadwick Velazco RN  Outcome: Ongoing   Bed in lowest position, call light in reach, nonskid footwear in place, bed alarm in place, hourly rounding in place.     Problem: Injury - Risk of, Physical Injury:  Goal: Absence of physical injury  Description: Absence of physical injury  7/8/2021 1050 by Erlin León Genesis Rodgers RN  Outcome: Ongoing     Problem: Mood - Altered:  Goal: Mood stable  Description: Mood stable  7/8/2021 1050 by Aidee Avina RN  Outcome: Ongoing     Problem: Mood - Altered:  Goal: Absence of abusive behavior  Description: Absence of abusive behavior  7/8/2021 1050 by Aidee Avina RN  Outcome: Ongoing     Problem: Mood - Altered:  Goal: Verbalizations of feeling emotionally comfortable while being cared for will increase  Description: Verbalizations of feeling emotionally comfortable while being cared for will increase  7/8/2021 1050 by Aidee Avina RN  Outcome: Ongoing     Problem: Psychomotor Activity - Altered:  Goal: Absence of psychomotor disturbance signs and symptoms  Description: Absence of psychomotor disturbance signs and symptoms  7/8/2021 1050 by Aidee Avina RN  Outcome: Ongoing     Problem: Sensory Perception - Impaired:  Goal: Demonstrations of improved sensory functioning will increase  Description: Demonstrations of improved sensory functioning will increase  7/8/2021 1050 by Aidee Avina RN  Outcome: Ongoing     Problem: Sensory Perception - Impaired:  Goal: Decrease in sensory misperception frequency  Description: Decrease in sensory misperception frequency  7/8/2021 1050 by Aidee Avina RN  Outcome: Ongoing     Problem: Sensory Perception - Impaired:  Goal: Able to refrain from responding to false sensory perceptions  Description: Able to refrain from responding to false sensory perceptions  7/8/2021 1050 by Aidee Avina RN  Outcome: Ongoing     Problem: Sensory Perception - Impaired:  Goal: Demonstrates accurate environmental perceptions  Description: Demonstrates accurate environmental perceptions  7/8/2021 1050 by Aidee Avina RN  Outcome: Ongoing     Problem: Sensory Perception - Impaired:  Goal: Able to distinguish between reality-based and nonreality-based thinking  Description: Able to distinguish between reality-based and nonreality-based thinking  7/8/2021 1050 by [facility] Danny Espinoza RN  Outcome: Ongoing     Problem: Sensory Perception - Impaired:  Goal: Able to interrupt nonreality-based thinking  Description: Able to interrupt nonreality-based thinking  7/8/2021 1050 by Paul Pimentel RN  Outcome: Ongoing     Problem: Sleep Pattern Disturbance:  Goal: Appears well-rested  Description: Appears well-rested  7/8/2021 1050 by Paul Pimentel RN  Outcome: Ongoing     Problem: Nutrition  Goal: Optimal nutrition therapy  7/8/2021 1050 by Paul Pimentel RN  Outcome: Ongoing   Educated on benefit of optimal nutrition, assisted feed.

## 2021-07-08 NOTE — CARE COORDINATION
07/08/21 1250   IMM Letter   IMM Letter given to Patient/Family/Significant other/Guardian/POA/by: Cris Valverde RN   IMM Letter date given: 07/08/21   IMM Letter time given: 1025     Electronically signed by Cris Valverde RN on 7/8/21 at 12:50 PM EDT

## 2021-07-08 NOTE — PROGRESS NOTES
[employer], report given to Farhad.   Transport to take pt to facility at 1400 Electronically signed by Citlali Sandy RN on 7/8/2021 at 1:40 PM

## 2021-07-08 NOTE — PROGRESS NOTES
Pt awake and appears anxious. Pt fidgety and messing with the tissue box and water cups. Pt took meds crushed in apple sauce and drank a small amount of water  without difficulty. Xanax given per prn order. Call light in reach. Will monitor.    Electronically signed by Anthony Souza RN on 7/7/2021 at 10:29 PM

## 2021-07-08 NOTE — PROGRESS NOTES
Physician Progress Note      Rajni Chou  CSN #:                  [id number]  :                       1940  ADMIT DATE:       2021 6:46 PM  [address] [place] DATE:  RESPONDING  PROVIDER #:        Jase Newsome CNP          QUERY TEXT:    Patient admitted with bacteremia. Per progress note:  Suspect infective   endocarditis with PM seeding. ECHO without mention of vegetation. If   possible, please document in the progress notes and discharge summary if you   are evaluating and/or treating any of the following: The medical record reflects the following:  Risk Factors: bacteremia, PPM  Clinical Indicators: ECHO without mention of vegetation  Treatment: ID consult, Ampicillin    Thank you,  Bennett Johnson, RN, BSN, CCDS  [email or web address]  Options provided:  -- Infective endocarditis  -- Infective endocarditis ruled out after study  -- Other - I will add my own diagnosis  -- Disagree - Not applicable / Not valid  -- Disagree - Clinically unable to determine / Unknown  -- Refer to Clinical Documentation Reviewer    PROVIDER RESPONSE TEXT:    Provider is clinically unable to determine a response to this query.     Query created by: Nena Kwon on 2021 10:40 AM      Electronically signed by:  Jase Newsome CNP 2021 2:24 PM

## 2021-07-08 NOTE — DISCHARGE INSTR - DIET

## 2021-07-08 NOTE — DISCHARGE INSTR - COC
Continuity of Care Form    Patient Name: Ernesto Gray   :  [id number]  MRN:  [id number]    Admit date:  2021  Discharge date:2021    Code Status Order: DNR-CCA   Advance Directives:   [facility] Documentation       Date/Time Healthcare Directive Type of Healthcare Directive Copy in [address] Agent's Name Healthcare Agent's Phone Number    21 0045  Unknown, patient unable to respond due to medical condition  --  --  --  --  --            Admitting Physician:  Chase Resendiz DO  PCP: Gogo Berger MD    Discharging Nurse: Kailey Bruno Unit/Room#: W1D-7031/4793-58  Discharging Unit Phone Number: [phone]    Emergency Contact:   Extended Emergency Contact Information  Primary Emergency Contact: Chip Diaz  Address: [address], [address] [facility] Phone: [phone]  Work Phone: [phone]  Relation: Spouse  Secondary Emergency Contact: Danny Diaz  Address: [address] [place] [address] Drive, [address] [place] [address] Road,[address]  Home Phone: [phone]  Work Phone: [phone]  Relation: Child    Past Surgical History:  Past Surgical History:   Procedure Laterality Date    BREAST BIOPSY Left     HAND SURGERY Left 2020    CLOSED REDUCTION LEFT SMALL FINGER DISLOCATION OF METACARPOPHALANGEAL JOINT performed by Caleb Chang MD at [facility] [address] Left        Immunization History:   Immunization History   Administered Date(s) Administered    Tdap (Boostrix, Adacel) 06/10/2020       Active Problems:  Patient Active Problem List   Diagnosis Code    AMS (altered mental status) R41.82    Bacteremia R78.81    Status cardiac pacemaker Z95.0    Late onset Alzheimer's dementia without behavioral disturbance ([address] G30.1, F02.80    Pacemaker electrode infection ([address] T82. 7XXA    Severe mitral regurgitation I34.0    Mitral valve prolapse I34.1    SSS (sick sinus syndrome) ([facility]) I49.5    PAF (paroxysmal atrial fibrillation) ([facility]) I48.0    Dementia without behavioral disturbance ([facility]) F03.90       Isolation/Infection:   Isolation            No Isolation          Patient Infection Status       Infection Onset Added Last Indicated Last Indicated By Review Planned Expiration Resolved Resolved By    None active    Resolved    COVID-19 Rule Out 07/01/21 07/01/21 07/01/21 COVID-19, Rapid (Ordered)   07/01/21 Rule-Out Test Resulted    COVID-19 Rule Out 03/03/21 03/03/21 03/03/21 COVID-19, Rapid (Ordered)   03/03/21 Rule-Out Test Resulted    COVID-19 Rule Out 06/10/20 06/10/20 06/11/20 COVID-19 (Ordered)   06/11/20 Rule-Out Test Resulted            Nurse Assessment:  Last Vital Signs: BP (!) 151/79   Pulse 67   Temp 97.9 °F (36.6 °C) (Oral)   Resp 16   Ht 5' 3\" (1.6 m)   Wt 123 lb 0.3 oz (55.8 kg)   SpO2 94%   BMI 21.79 kg/m²     Last documented pain score (0-10 scale): Pain Level: 0  Last Weight:   Wt Readings from Last 1 Encounters:   07/08/21 123 lb 0.3 oz (55.8 kg)     Mental Status:  disoriented and alert    IV Access:  - None    Nursing Mobility/ADLs:  Walking   Dependent  Transfer  Dependent  Bathing  Dependent  Dressing  Dependent  Toileting  Dependent  Feeding  Dependent  Med Admin  Dependent  Med Delivery   crushed    Wound Care Documentation and Therapy:        Elimination:  Continence:   · Bowel: No  · Bladder: No  Urinary Catheter: None   Colostomy/Ileostomy/Ileal Conduit: No       Date of Last BM:7/7/2021    Intake/Output Summary (Last 24 hours) at 7/8/2021 0955  Last data filed at 7/8/2021 0828  Gross per 24 hour   Intake 240 ml   Output 2250 ml   Net -2010 ml     I/O last 3 completed shifts:   In: 61 [P.O.:60]  Out: 2250 [Urine:2250]    Safety Concerns:     History of Falls (last 30 days) and At Risk for Falls    Impairments/Disabilities:      Speech, Vision and Hearing    Nutrition Therapy:  Current Nutrition Therapy:   - Oral Diet:  easy to swallow, high calorie supplements    Routes of Feeding: Oral  Liquids: Thin Liquids  Daily Fluid Restriction: no  Last Modified Barium Swallow with Video (Video Swallowing Test): not done    Treatments at the Time of Hospital Discharge:   Respiratory Treatments: none    Oxygen Therapy:  is not on home oxygen therapy. Ventilator:    - No ventilator support    Rehab Therapies: Physical Therapy, Occupational Therapy and Speech/Language Therapy  Weight Bearing Status/Restrictions: No weight bearing restirctions  Other Medical Equipment (for information only, NOT a DME order):  stedy to move  Other Treatments: none    Patient's personal belongings (please select all that are sent with patient):  None    RN SIGNATURE:  Electronically signed by Savage Yo RN on 7/8/21 at 12:18 PM EDT    CASE MANAGEMENT/SOCIAL WORK SECTION    Inpatient Status Date: ***    Readmission Risk Assessment Score:  Readmission Risk              Risk of Unplanned Readmission:  14           Discharging to Facility/ Agency   · Name: [facility]  · Address:  [place] [address]   · Phone:  [phone]  · Fax:  [phone]    / signature: Electronically signed by Rigo Vargas RN on 7/8/21 at 10:33 AM EDT    PHYSICIAN SECTION    Prognosis: Fair    Condition at Discharge: Stable    Rehab Potential (if transferring to Rehab): Fair    Recommended Labs or Other Treatments After Discharge: PT/OT/RN    Physician Certification: I certify the above information and transfer of Anali Rodriguez  is necessary for the continuing treatment of the diagnosis listed and that she requires [facility] for less 30 days.      Update Admission H&P: No change in H&P    PHYSICIAN SIGNATURE:  Electronically signed by DIXIE Hernandez CNP on 7/8/21 at 9:55 AM EDT/ Dr. Racheal Arredondo

## 2021-07-08 NOTE — CARE COORDINATION
CASE MANAGEMENT DISCHARGE SUMMARY:    DISCHARGE DATE: 7/8/21    Discharging to Facility/ Agency   · Name: [facility]  · Address:  [address] [facility]   · Phone:  [phone]  · Fax:  [phone]    TRANSPORTATION: [facility]              TIME: 1400    HENS/PASAAR COMPLETED: 7/8/21    Jonne Phalen, RN, BSN, Case Management  [phone]    Electronically signed by Jonne Phalen, RN on 7/8/2021 at 10:21 AM

## 2021-07-10 LAB — BLOOD CULTURE, ROUTINE: NORMAL

## 2021-07-10 NOTE — DISCHARGE SUMMARY
Hospital Medicine Discharge Summary    Patient ID: Farhad Fuchs      Patient's PCP: Fabrice Mckeon MD    Admit Date: 7/1/2021     Discharge Date: 7/8/2021      Admitting Physician: Oliva Hyde DO     Discharge Physician: DIXIE Chadwick - CNP       Discharge Diagnoses: Active Hospital Problems    Diagnosis Date Noted    Severe mitral regurgitation [I34.0]     Mitral valve prolapse [I34.1]     SSS (sick sinus syndrome) ([facility]) [I49.5]     PAF (paroxysmal atrial fibrillation) ([facility]) [I48.0]     Dementia without behavioral disturbance ([address] [F03.90]     Pacemaker electrode infection ([address] [T82. 7XXA] 07/03/2021    Bacteremia [R78.81] 07/02/2021    Status cardiac pacemaker [Z95.0] 07/02/2021    Late onset Alzheimer's dementia without behavioral disturbance ([address] [G30.1, F02.80] 07/02/2021    AMS (altered mental status) [R41.82] 07/01/2021       The patient was seen and examined on day of discharge and this discharge summary is in conjunction with any daily progress note from day of discharge. Disposition:  [] Home  [] Home with home health [] Rehab [] Psych [x] SNF  [] LTAC  [] Long term nursing home or group home [] Transfer to ICU  [] Transfer to PCU [] Other:          PCP/SNF to follow up: 1 wk pcp    D/C condition: stable    Code status: DNR-CCA        D/C Meds: amox     Hospital Course: Pt admitted 7/1/21 with AMS. Bld Cx 2/2 + E. Faecalis. Echo 7/2/21- Grade II DD,Mitral valve prolapse of posterior leaflet with severe medially directed eccentric regurgitation      Acute metabolic encephalopathy 2/2 to enterococcal bacteremia  -Enterococcus faecalis bacteremia 2/2--- Suspect  infective endocarditis with PM seeding-patient has severe MR  -Severe mitral valve prolapse and mitral regurgitation  -Paroxysmal A.  Fib and SSS--rhythm paced--on Eliquis, amiodarone, metoprolol-stable  - last interrogation 4/16/21  -Sick sinus syndrome status post permanent pacemaker in the past  -Alzheimer's dementia-on Aricept  -Essential hypertension-stable  -Hypokalemia-resolved     Plan:  -Empiric antibiotics--IV ampicillin for Enterococcus faecalis bacteremia with concern for endocarditis--awaiting for sensitivities. If VRE, will switch to vanc or linezolid   -? Need for MANE, cards consulted   - has known mitral valve prolapse/severe mitral regurg.   - outpatient note states she is not candidate for repair or replace w/cormorbidites.   -ID following, consult placed for cardiology(eval PM removal)  -Discussed with family at bedside  -Continue chronic medications  - per cardiology would not persue lead extraction as the pt is pacemaker dependent unless infection unable to be cleared  - follow repeat blood cultures (drawn 7/6)  - amox 500mg q8h x6 wks per ID  - ok for d/c.  - will see if can repeat blood cx as outpatient once off antibx tx to check for relapse and can pursue life long suppressive course    Exam:     BP (!) 151/79   Pulse 67   Temp 97.9 °F (36.6 °C) (Oral)   Resp 16   Ht 5' 3\" (1.6 m)   Wt 123 lb 0.3 oz (55.8 kg)   SpO2 94%   BMI 21.79 kg/m²   General appearance: No apparent distress, appears stated age and cooperative. HEENT: Pupils equal, round, and reactive to light. Conjunctivae/corneas clear. Neck: Supple, with full range of motion. No jugular venous distention. Trachea midline. Respiratory:  Normal respiratory effort. Clear to auscultation, bilaterally without Rales/Wheezes/Rhonchi. Cardiovascular: Regular rate and rhythm with normal S1/S2 without murmurs, rubs or gallops. Abdomen: Soft, non-tender, non-distended with normal bowel sounds. Musculoskelatal: No clubbing, cyanosis or edema bilaterally. Full range of motion without deformity. Skin: Skin color, texture, turgor normal.  No rashes or lesions. Neurologic:  Neurovascularly intact without any focal sensory/motor deficits.  Cranial nerves: II-XII intact, grossly non-focal.  Psychiatric: Alert and oriented, thought content appropriate, normal insight      Consults:     IP CONSULT TO SOCIAL WORK  IP CONSULT TO INFECTIOUS DISEASES  IP CONSULT TO CARDIOLOGY  IP CONSULT TO DIETITIAN    Diagnostic tests: Imaging:  CT HEAD WO CONTRAST   Final Result   No acute intracranial abnormality.       Diffuse atrophic changes with findings suggesting chronic microvascular   ischemia           XR CHEST PORTABLE   Final Result   Nonspecific subsegmental left basilar opacity.                 Labs: For convenience and continuity at follow-up the following most recent labs are provided:      CBC:    Lab Results   Component Value Date    WBC 4.2 07/03/2021    HGB 14.9 07/03/2021    HCT 43.1 07/03/2021     07/03/2021       Renal:    Lab Results   Component Value Date     07/07/2021    K 4.0 07/07/2021    K 3.5 07/03/2021     07/07/2021    CO2 23 07/07/2021    BUN 13 07/07/2021    CREATININE 0.7 07/07/2021    CALCIUM 8.7 07/07/2021       Discharge Medications:     Discharge Medication List as of 7/8/2021 12:18 PM           Details   potassium bicarb-citric acid (EFFER-K) 20 MEQ TBEF effervescent tablet Take 1 tablet by mouth 2 times daily, Disp-60 tablet, R-2Normal      amoxicillin (AMOXIL) 500 MG capsule Take 1 capsule by mouth 3 times daily, Disp-126 capsule, R-0Normal              Details   amiodarone (CORDARONE) 200 MG tablet Take 200 mg by mouth dailyHistorical Med      apixaban (ELIQUIS) 5 MG TABS tablet Take 5 mg by mouth 2 times dailyHistorical Med      metoprolol succinate (TOPROL XL) 25 MG extended release tablet Take 25 mg by mouth 2 times dailyHistorical Med      OLANZapine (ZYPREXA) 5 MG tablet Take 5 mg by mouth every morningHistorical Med      latanoprost (XALATAN) 0.005 % ophthalmic solution Apply 1 drop to eye nightlyHistorical Med             Time Spent on discharge is more than 45 mins in the examination, evaluation, counseling and review of medications and discharge plan.       Signed:    Andi Britton Jordon Ibrahim, DIXIE - CNP   7/10/2021      Thank you Dottie Kemp MD for the opportunity to be involved in this patient's care. If you have any questions or concerns please feel free to contact me at [phone].

## (undated) DEVICE — MINOR SET UP PK

## (undated) DEVICE — SYRINGE MED 10ML LUERLOCK TIP W/O SFTY DISP

## (undated) DEVICE — CHLORAPREP 26ML ORANGE

## (undated) DEVICE — GOWN,SIRUS,POLYRNF,BRTHSLV,XL,30/CS: Brand: MEDLINE

## (undated) DEVICE — Z DISCONTINUED USE 2275676 GLOVE SURG SZ 65 L12IN FNGR THK87MIL DK GRN LTX FREE ISOLEX

## (undated) DEVICE — SPONGE GZ W4XL4IN COT 12 PLY TYP VII WVN C FLD DSGN

## (undated) DEVICE — GLOVE SURG SZ 65 CRM LTX FREE POLYISOPRENE POLYMER BEAD ANTI

## (undated) DEVICE — BANDAGE COMPR W4INXL12FT E DISP ESMARCH EVEN

## (undated) DEVICE — SOLUTION IV IRRIG 250ML ST LF 0.9% SODIUM 2F7122

## (undated) DEVICE — DRAPE HND W114XL142IN BLU POLYPR W O PCH FEN CRD AND TB HLDR

## (undated) DEVICE — STRIP,CLOSURE,WOUND,MEDI-STRIP,1/2X4: Brand: MEDLINE

## (undated) DEVICE — COVER LT HNDL BLU PLAS

## (undated) DEVICE — GLOVE SURG SZ 75 CRM LTX FREE POLYISOPRENE POLYMER BEAD ANTI

## (undated) DEVICE — UNDERGLOVE SURG SZ 8 FNGR THK0.21MIL GRN LTX BEAD CUF

## (undated) DEVICE — ZIMMER® STERILE DISPOSABLE TOURNIQUET CUFF WITH PLC, DUAL PORT, SINGLE BLADDER, 18 IN. (46 CM)

## (undated) DEVICE — SHEET,DRAPE,53X77,STERILE: Brand: MEDLINE

## (undated) DEVICE — PADDING UNDERCAST W4INXL4YD 100% COT CRIMPED FINISH WBRL II

## (undated) DEVICE — BANDAGE COMPR W4INXL15FT BGE E SGL LAYERED CLP CLSR

## (undated) DEVICE — Z DISCONTINUED PER MEDLINE (LOW STOCK)  USE 2422770 DRAPE C ARM W54XL78IN FOR FLROSCN

## (undated) DEVICE — TUBING, SUCTION, 1/4" X 12', STRAIGHT: Brand: MEDLINE

## (undated) DEVICE — DRESSING PETRO W3XL3IN OIL EMUL N ADH GZ KNIT IMPREG CELOS

## (undated) DEVICE — GOWN SIRUS NONREIN XL W/TWL: Brand: MEDLINE INDUSTRIES, INC.

## (undated) DEVICE — SUTURE CHROMIC GUT SZ 4-0 L27IN ABSRB BRN FS-2 L19MM 3/8 635H